# Patient Record
Sex: MALE | Race: WHITE | Employment: FULL TIME | URBAN - METROPOLITAN AREA
[De-identification: names, ages, dates, MRNs, and addresses within clinical notes are randomized per-mention and may not be internally consistent; named-entity substitution may affect disease eponyms.]

---

## 2019-04-22 ENCOUNTER — APPOINTMENT (OUTPATIENT)
Dept: GENERAL RADIOLOGY | Age: 44
DRG: 247 | End: 2019-04-22
Attending: EMERGENCY MEDICINE
Payer: COMMERCIAL

## 2019-04-22 ENCOUNTER — HOSPITAL ENCOUNTER (INPATIENT)
Age: 44
LOS: 3 days | Discharge: HOME | DRG: 247 | End: 2019-04-25
Attending: EMERGENCY MEDICINE | Admitting: INTERNAL MEDICINE
Payer: COMMERCIAL

## 2019-04-22 DIAGNOSIS — I50.9 CONGESTIVE HEART FAILURE, UNSPECIFIED HF CHRONICITY, UNSPECIFIED HEART FAILURE TYPE (HCC): ICD-10-CM

## 2019-04-22 DIAGNOSIS — R07.9 EXERTIONAL CHEST PAIN: Primary | ICD-10-CM

## 2019-04-22 DIAGNOSIS — R77.8 ELEVATED TROPONIN: ICD-10-CM

## 2019-04-22 PROBLEM — I21.4 NSTEMI (NON-ST ELEVATED MYOCARDIAL INFARCTION) (HCC): Status: ACTIVE | Noted: 2019-04-22

## 2019-04-22 LAB
ALBUMIN SERPL-MCNC: 3.8 G/DL (ref 3.5–5)
ALBUMIN/GLOB SERPL: 1 {RATIO} (ref 1.1–2.2)
ALP SERPL-CCNC: 92 U/L (ref 45–117)
ALT SERPL-CCNC: 39 U/L (ref 12–78)
ANION GAP SERPL CALC-SCNC: 7 MMOL/L (ref 5–15)
AST SERPL-CCNC: 30 U/L (ref 15–37)
ATRIAL RATE: 58 BPM
BASOPHILS # BLD: 0 K/UL (ref 0–0.1)
BASOPHILS NFR BLD: 1 % (ref 0–1)
BILIRUB SERPL-MCNC: 0.6 MG/DL (ref 0.2–1)
BUN SERPL-MCNC: 13 MG/DL (ref 6–20)
BUN/CREAT SERPL: 14 (ref 12–20)
CALCIUM SERPL-MCNC: 9.2 MG/DL (ref 8.5–10.1)
CALCULATED P AXIS, ECG09: 6 DEGREES
CALCULATED R AXIS, ECG10: 29 DEGREES
CALCULATED T AXIS, ECG11: -18 DEGREES
CHLORIDE SERPL-SCNC: 106 MMOL/L (ref 97–108)
CK SERPL-CCNC: 139 U/L (ref 39–308)
CO2 SERPL-SCNC: 26 MMOL/L (ref 21–32)
COMMENT, HOLDF: NORMAL
CREAT SERPL-MCNC: 0.94 MG/DL (ref 0.7–1.3)
DIAGNOSIS, 93000: NORMAL
DIFFERENTIAL METHOD BLD: NORMAL
EOSINOPHIL # BLD: 0.1 K/UL (ref 0–0.4)
EOSINOPHIL NFR BLD: 2 % (ref 0–7)
ERYTHROCYTE [DISTWIDTH] IN BLOOD BY AUTOMATED COUNT: 12 % (ref 11.5–14.5)
GLOBULIN SER CALC-MCNC: 3.9 G/DL (ref 2–4)
GLUCOSE SERPL-MCNC: 106 MG/DL (ref 65–100)
HCT VFR BLD AUTO: 43.3 % (ref 36.6–50.3)
HGB BLD-MCNC: 14.6 G/DL (ref 12.1–17)
IMM GRANULOCYTES # BLD AUTO: 0 K/UL (ref 0–0.04)
IMM GRANULOCYTES NFR BLD AUTO: 0 % (ref 0–0.5)
LYMPHOCYTES # BLD: 1.7 K/UL (ref 0.8–3.5)
LYMPHOCYTES NFR BLD: 27 % (ref 12–49)
MCH RBC QN AUTO: 30.9 PG (ref 26–34)
MCHC RBC AUTO-ENTMCNC: 33.7 G/DL (ref 30–36.5)
MCV RBC AUTO: 91.5 FL (ref 80–99)
MONOCYTES # BLD: 0.5 K/UL (ref 0–1)
MONOCYTES NFR BLD: 8 % (ref 5–13)
NEUTS SEG # BLD: 3.9 K/UL (ref 1.8–8)
NEUTS SEG NFR BLD: 62 % (ref 32–75)
NRBC # BLD: 0 K/UL (ref 0–0.01)
NRBC BLD-RTO: 0 PER 100 WBC
P-R INTERVAL, ECG05: 142 MS
PLATELET # BLD AUTO: 172 K/UL (ref 150–400)
PMV BLD AUTO: 10.6 FL (ref 8.9–12.9)
POTASSIUM SERPL-SCNC: 3.7 MMOL/L (ref 3.5–5.1)
PROT SERPL-MCNC: 7.7 G/DL (ref 6.4–8.2)
Q-T INTERVAL, ECG07: 442 MS
QRS DURATION, ECG06: 82 MS
QTC CALCULATION (BEZET), ECG08: 433 MS
RBC # BLD AUTO: 4.73 M/UL (ref 4.1–5.7)
SAMPLES BEING HELD,HOLD: NORMAL
SODIUM SERPL-SCNC: 139 MMOL/L (ref 136–145)
TROPONIN I SERPL-MCNC: 1.2 NG/ML
TROPONIN I SERPL-MCNC: 1.47 NG/ML
TROPONIN I SERPL-MCNC: 1.65 NG/ML
VENTRICULAR RATE, ECG03: 58 BPM
WBC # BLD AUTO: 6.2 K/UL (ref 4.1–11.1)

## 2019-04-22 PROCEDURE — 74011250636 HC RX REV CODE- 250/636: Performed by: NURSE PRACTITIONER

## 2019-04-22 PROCEDURE — 36415 COLL VENOUS BLD VENIPUNCTURE: CPT

## 2019-04-22 PROCEDURE — 82550 ASSAY OF CK (CPK): CPT

## 2019-04-22 PROCEDURE — 74011250637 HC RX REV CODE- 250/637: Performed by: NURSE PRACTITIONER

## 2019-04-22 PROCEDURE — 80053 COMPREHEN METABOLIC PANEL: CPT

## 2019-04-22 PROCEDURE — 84484 ASSAY OF TROPONIN QUANT: CPT

## 2019-04-22 PROCEDURE — 85025 COMPLETE CBC W/AUTO DIFF WBC: CPT

## 2019-04-22 PROCEDURE — 74011250637 HC RX REV CODE- 250/637: Performed by: EMERGENCY MEDICINE

## 2019-04-22 PROCEDURE — 99285 EMERGENCY DEPT VISIT HI MDM: CPT

## 2019-04-22 PROCEDURE — 65660000000 HC RM CCU STEPDOWN

## 2019-04-22 PROCEDURE — 93005 ELECTROCARDIOGRAM TRACING: CPT

## 2019-04-22 PROCEDURE — 71046 X-RAY EXAM CHEST 2 VIEWS: CPT

## 2019-04-22 RX ORDER — GUAIFENESIN 100 MG/5ML
162 LIQUID (ML) ORAL
Status: DISCONTINUED | OUTPATIENT
Start: 2019-04-22 | End: 2019-04-22

## 2019-04-22 RX ORDER — MELATONIN
1000 DAILY
COMMUNITY

## 2019-04-22 RX ORDER — AMLODIPINE AND BENAZEPRIL HYDROCHLORIDE 5; 10 MG/1; MG/1
1 CAPSULE ORAL DAILY
COMMUNITY
End: 2020-03-19 | Stop reason: SDUPTHER

## 2019-04-22 RX ORDER — ATORVASTATIN CALCIUM 20 MG/1
20 TABLET, FILM COATED ORAL
Status: DISCONTINUED | OUTPATIENT
Start: 2019-04-22 | End: 2019-04-23

## 2019-04-22 RX ORDER — AMLODIPINE BESYLATE 5 MG/1
5 TABLET ORAL DAILY
Status: DISCONTINUED | OUTPATIENT
Start: 2019-04-23 | End: 2019-04-25 | Stop reason: HOSPADM

## 2019-04-22 RX ORDER — GUAIFENESIN 100 MG/5ML
81 LIQUID (ML) ORAL DAILY
Status: DISCONTINUED | OUTPATIENT
Start: 2019-04-23 | End: 2019-04-25 | Stop reason: HOSPADM

## 2019-04-22 RX ORDER — LISINOPRIL 10 MG/1
10 TABLET ORAL DAILY
Status: DISCONTINUED | OUTPATIENT
Start: 2019-04-23 | End: 2019-04-25 | Stop reason: HOSPADM

## 2019-04-22 RX ORDER — ENOXAPARIN SODIUM 100 MG/ML
1 INJECTION SUBCUTANEOUS
Status: COMPLETED | OUTPATIENT
Start: 2019-04-22 | End: 2019-04-22

## 2019-04-22 RX ORDER — CARVEDILOL 3.12 MG/1
3.12 TABLET ORAL 2 TIMES DAILY
Status: DISCONTINUED | OUTPATIENT
Start: 2019-04-22 | End: 2019-04-24

## 2019-04-22 RX ADMIN — ENOXAPARIN SODIUM 80 MG: 80 INJECTION SUBCUTANEOUS at 16:38

## 2019-04-22 RX ADMIN — NITROGLYCERIN 1 INCH: 20 OINTMENT TOPICAL at 15:43

## 2019-04-22 RX ADMIN — CARVEDILOL 3.12 MG: 3.12 TABLET, FILM COATED ORAL at 18:36

## 2019-04-22 RX ADMIN — ATORVASTATIN CALCIUM 20 MG: 20 TABLET, FILM COATED ORAL at 22:17

## 2019-04-22 NOTE — Clinical Note
Lesion located in the Proximal RCA. Balloon inserted. Balloon inflated using multiple inflations inflation technique. Pressure = 18 norma; Duration = 10 sec. Inflation 2: Pressure: 18 norma; Duration: 8 sec.

## 2019-04-22 NOTE — ED PROVIDER NOTES
The history is provided by the patient. Chest Pain This is a new problem. The current episode started more than 2 days ago. The problem has not changed (exertional episodes;) since onset. The pain is associated with exertion. Pain location: upper chest. The pain is moderate. The quality of the pain is described as pressure-like and burning. Radiates to: vague pain upper thoracic back. The symptoms are aggravated by exertion. He has tried nothing for the symptoms. Risk factors include family history and hypertension. His past medical history is significant for HTN. Patient took 4 aspirin at Urgent Care today Past Medical History:  
Diagnosis Date  
 HTN (hypertension) History reviewed. No pertinent surgical history. No family history on file. Social History Socioeconomic History  Marital status:  Spouse name: Not on file  Number of children: Not on file  Years of education: Not on file  Highest education level: Not on file Occupational History  Not on file Social Needs  Financial resource strain: Not on file  Food insecurity:  
  Worry: Not on file Inability: Not on file  Transportation needs:  
  Medical: Not on file Non-medical: Not on file Tobacco Use  Smoking status: Never Smoker Substance and Sexual Activity  Alcohol use: Not on file  Drug use: Not on file  Sexual activity: Not on file Lifestyle  Physical activity:  
  Days per week: Not on file Minutes per session: Not on file  Stress: Not on file Relationships  Social connections:  
  Talks on phone: Not on file Gets together: Not on file Attends [employer] service: Not on file Active member of club or organization: Not on file Attends meetings of clubs or organizations: Not on file Relationship status: Not on file  Intimate partner violence:  
  Fear of current or ex partner: Not on file Emotionally abused: Not on file Physically abused: Not on file Forced sexual activity: Not on file Other Topics Concern  Not on file Social History Narrative  Not on file ALLERGIES: Patient has no known allergies. Review of Systems Cardiovascular: Positive for chest pain. All other systems reviewed and are negative. Vitals:  
 04/22/19 1251 BP: (!) 160/98 Pulse: 67 Resp: 18 Temp: 98.7 °F (37.1 °C) SpO2: 98% Weight: 84.8 kg (187 lb) Height: 6' (1.829 m) Physical Exam  
Constitutional: He appears well-developed and well-nourished. HENT:  
Head: Normocephalic and atraumatic. Mouth/Throat: Oropharynx is clear and moist.  
Eyes: Pupils are equal, round, and reactive to light. EOM are normal.  
Neck: Normal range of motion. Neck supple. Cardiovascular: Normal rate, regular rhythm, normal heart sounds and intact distal pulses. Exam reveals no gallop and no friction rub. No murmur heard. Pulmonary/Chest: Effort normal. No respiratory distress. He has no wheezes. He has no rales. Abdominal: Soft. There is no tenderness. There is no rebound. Musculoskeletal: Normal range of motion. He exhibits no tenderness. Neurological: He is alert. No cranial nerve deficit. Motor; symmetric Skin: No erythema. Psychiatric: He has a normal mood and affect. His behavior is normal.  
Nursing note and vitals reviewed. MDM Procedures ED EKG interpretation: 
Rhythm: sinus bradycardia; and regular . Rate (approx.): 58; Axis: normal; P wave: normal; QRS interval: normal ; ST/T wave: normal; in  Lead: ; Other findings: . This EKG was interpreted by Conner Velarde MD,ED Provider.  1:26 PM

## 2019-04-22 NOTE — ED TRIAGE NOTES
Triage:  Pt to ED via referral of [facility] due to concerns over reoccuring chest pain, fatigue, and abnormal labs (elevated troponin 0.66 at [facility]). Pt states over the last several days has noted midsternal chest discomfort with activity and yesterday began to feel more fatigued than normal .

## 2019-04-22 NOTE — ROUTINE PROCESS
TRANSFER - OUT REPORT: 
 
Verbal report given to Sharmon Nyhan, RN(name) on Maximino Wang  being transferred to CVSU(unit) for routine progression of care Report consisted of patients Situation, Background, Assessment and  
Recommendations(SBAR). Information from the following report(s) SBAR, ED Summary, MAR, Recent Results and Cardiac Rhythm NSR was reviewed with the receiving nurse. Lines:  
Peripheral IV 04/22/19 Left Antecubital (Active) Site Assessment Clean, dry, & intact 4/22/2019  1:23 PM  
Phlebitis Assessment 0 4/22/2019  1:23 PM  
Infiltration Assessment 0 4/22/2019  1:23 PM  
Dressing Status Clean, dry, & intact 4/22/2019  1:23 PM  
Dressing Type Transparent 4/22/2019  1:23 PM  
Hub Color/Line Status Pink 4/22/2019  1:23 PM  
Alcohol Cap Used No 4/22/2019  1:23 PM  
  
 
Opportunity for questions and clarification was provided. Patient transported with: 
 Monitor Medic

## 2019-04-22 NOTE — Clinical Note
Lesion located in the Proximal RCA. Balloon inserted. Balloon inflated using multiple inflations inflation technique. Pressure = 12 norma; Duration = 14 sec. Inflation 2: Pressure: 16 norma; Duration: 6 sec.

## 2019-04-22 NOTE — Clinical Note
Lesion located in the Proximal RCA. Balloon inserted. Balloon inflated using multiple inflations inflation technique. Pressure = 12 norma; Duration = 15 sec. Inflation 2: Pressure: 14 norma; Duration: 14 sec.

## 2019-04-22 NOTE — Clinical Note
Lesion located in the Proximal RCA. Balloon inserted. Balloon inflated using multiple inflations inflation technique. Pressure = 12 norma; Duration = 10 sec. Inflation 2: Pressure: 16 norma; Duration: 7 sec. Inflation 3: Pressure: 18 norma; Duration: 8 sec.

## 2019-04-22 NOTE — H&P
Cardiology H&P Note Patient Name: Barry Lawson  : 1975 MRN: [id number] Date: 2019  Time: 3:09 PM 
 
Admit Diagnosis: No admission diagnoses are documented for this encounter. Primary Cardiologist: Dr. Annie Christina MD   Consulting Cardiologist: Elise Sood. James Reyes M.D.  
 
Reason for Consult: chest pain, elevated troponin Requesting MD: Dr. Saul Virk MD 
 
HPI: 
Barry Lawson is a 44 y.o. male presented on 2019  With chief c/o of chest pain with exertion. Has PMH of HTN. Reports episodes of chest pain when running Friday and yesterday. States he developed burning sensation across upper chest which occurred when he was running quickly. Reported that it worsened with deep breath. Also noticed similar pain across upper back. Did not notice SOB or increased diaphoresis. No N or V. He continued to run but then continued to have pain so he slowed to a jog and pain went away. Pt runs 3-7 miles or exercises 3-4 times per week and has never had this pain before. Additionally reports that he noticed new fatigue Friday, thought he was getting flu or sick although no fever or myalgias. Felt better on Saturday but did not exercise. Felt fatigued again yesterday. No fevers or chills. Today he has had no chest pain but has not exercised. He's planning to run an event this weekend so he went to [facility] to be checked out before the race. [facility] denoted an elevated troponin (0.66) and pt was given 4 baby asa and referred to [facility] [place] [facility]. Pt Denies any hx of HLD (said he had recent blood work with high HDL), DM. Saw Dr. Ryne Mcfadden in  for bradycardia evaluation. Stress echo at that time was normal.  Bradycardia attributed to well conditioned heart. SH: never smoker, daily ETOH use (1 glass of wine or beer). FH: Father had MI in his 29's- was a heavy smoker at that time, quit after MI and has not had another event. Subjective:  Currently denies chest pain, SOB, dizziness, back pain. Assessment and Plan 1. NSTEMI:   
-Troponin trending up -.66-->>1.65. Continue to trend 
-No chest pain currently. Chest pain was with exertion only. -ASA 81 mg daily (received 4 baby ASA today) -Lovenox 1mg/kg x 1 now 
-Start statin.   
-Start low dose BB if HR will tolerate. -Given chest pain with exertion, rising troponin and family history, will proceed with [facility] tomorrow at 1Pm with Dr. Duke Javier MD 
 
2. Hx of HTN: 
-BP elevated on presentation, better now.  
-Continue home amlodipine-benazapril 3. Family history early CAD Saw and evaluated pt and agree with above assessment and plan. Pt with NSTEMI. Stabilize with cardiac meds and anticoagulation (lovenox for ACS x1) as pt is chest pain free now. Plan for cardiac cath tomorrow with Dr. Soco Fleming. Dr. Fred Matthews to follow. Miguel Newell MD 
 
 
Review of Symptoms: 
Constitutional: Negative for fever, chills, weight loss, +fatigue. HEENT: Negative for dysphagia. Respiratory: Negative for cough,  
Cardiovascular: Positive for chest pain with exertion, negative palpitations, orthopnea, leg swelling, syncope, and PND. Gastrointestinal: Negative for nausea, vomiting, diarrhea, blood in stool and melena, abdominal pain Genitourinary: Negative for hematuria Musculoskeletal: Negative for myalgias Skin: Negative for rash. Heme: Does not bleed or bruise easily. Neurological: Negative for speech changes and focal weakness Previous treatment/evaluation includes stress echo . Cardiac risk factors: family history, male gender, hypertension. Past Medical History:  
Diagnosis Date  
 HTN (hypertension) History reviewed. No pertinent surgical history. Current Facility-Administered Medications Medication Dose Route Frequency  nitroglycerin (NITROBID) 2 % ointment 1 Inch  1 Inch Topical NOW No current outpatient medications on file. No Known Allergies No family history on file. Social History Socioeconomic History  Marital status:  Spouse name: Not on file  Number of children: Not on file  Years of education: Not on file  Highest education level: Not on file Tobacco Use  Smoking status: Never Smoker Objective: 
  Physical Exam 
 
Vitals:  
Vitals:  
 04/22/19 1251 BP: (!) 160/98 Pulse: 67 Resp: 18 Temp: 98.7 °F (37.1 °C) SpO2: 98% Weight: 187 lb (84.8 kg) Height: 6' (1.829 m) General:    Alert, cooperative, no distress, appears stated age. Neck:   Supple,   
Back:     Symmetric,  
Lungs:     Clear to auscultation bilaterally. Heart[de-identified]    Regular rate and rhythm, S1, S2 normal, no murmur, click, rub or gallop. Abdomen:     Soft, non-tender. Bowel sounds normal. No masses,  No   
  organomegaly. Extremities:   Extremities normal, atraumatic, no cyanosis or edema. Vascular:   Pulses - 2+ Skin:   Skin color normal. No rashes or lesions Neurologic:   CN II-XII grossly intact. Normal strength throughout. 12 lead EKG: NSR with t wave inversion lead II, avF (was noted in lead II on prior echo 2011) Data Review:  
 
Radiology: CXR: negative Recent Labs  
  04/22/19 
1319 TROIQ 1.65* Recent Labs  
  04/22/19 
1319   
K 3.7  CO2 26 BUN 13  
CREA 0.94 * CA 9.2 Recent Labs  
  04/22/19 
1319 WBC 6.2 HGB 14.6 HCT 43.3  Recent Labs  
  04/22/19 
1319 SGOT 30 AP 92 No results for input(s): CHOL, LDLC in the last 72 hours. No lab exists for component: TGL, HDLC,  HBA1C No results for input(s): CRP, TSH, TSHEXT in the last 72 hours. No lab exists for component: ESR Thank you very much for this referral. I appreciate the opportunity to participate in this patient's care. I will follow along with above stated plan. Mckinley Mosqueda. YENI Reyes [facility] [address], [address] Soumya Springer 
   [phone] Mehul Ramirez MD

## 2019-04-22 NOTE — Clinical Note
Lesion located in the Mid RCA. Balloon inserted. Balloon inflated using multiple inflations inflation technique. Pressure = 16 norma; Duration = 10 sec. Inflation 2: Pressure: 20 norma; Duration: 10 sec. Inflation 3: Pressure: 22 norma; Duration: 10 sec.

## 2019-04-23 LAB
ACT BLD: 263 SECS (ref 79–138)
ANION GAP SERPL CALC-SCNC: 5 MMOL/L (ref 5–15)
APTT PPP: 37.2 SEC (ref 22.1–32)
BASOPHILS # BLD: 0 K/UL (ref 0–0.1)
BASOPHILS NFR BLD: 0 % (ref 0–1)
BUN SERPL-MCNC: 13 MG/DL (ref 6–20)
BUN/CREAT SERPL: 14 (ref 12–20)
CALCIUM SERPL-MCNC: 8.8 MG/DL (ref 8.5–10.1)
CHLORIDE SERPL-SCNC: 108 MMOL/L (ref 97–108)
CHOLEST SERPL-MCNC: 169 MG/DL
CO2 SERPL-SCNC: 27 MMOL/L (ref 21–32)
CREAT SERPL-MCNC: 0.91 MG/DL (ref 0.7–1.3)
DIFFERENTIAL METHOD BLD: NORMAL
EOSINOPHIL # BLD: 0 K/UL (ref 0–0.4)
EOSINOPHIL NFR BLD: 0 % (ref 0–7)
ERYTHROCYTE [DISTWIDTH] IN BLOOD BY AUTOMATED COUNT: 11.9 % (ref 11.5–14.5)
GLUCOSE SERPL-MCNC: 98 MG/DL (ref 65–100)
HCT VFR BLD AUTO: 41.6 % (ref 36.6–50.3)
HDLC SERPL-MCNC: 54 MG/DL
HDLC SERPL: 3.1 {RATIO} (ref 0–5)
HGB BLD-MCNC: 13.9 G/DL (ref 12.1–17)
IMM GRANULOCYTES # BLD AUTO: 0 K/UL (ref 0–0.04)
IMM GRANULOCYTES NFR BLD AUTO: 0 % (ref 0–0.5)
LDLC SERPL CALC-MCNC: 101.4 MG/DL (ref 0–100)
LIPID PROFILE,FLP: ABNORMAL
LYMPHOCYTES # BLD: 1.8 K/UL (ref 0.8–3.5)
LYMPHOCYTES NFR BLD: 26 % (ref 12–49)
MAGNESIUM SERPL-MCNC: 2.3 MG/DL (ref 1.6–2.4)
MCH RBC QN AUTO: 30.6 PG (ref 26–34)
MCHC RBC AUTO-ENTMCNC: 33.4 G/DL (ref 30–36.5)
MCV RBC AUTO: 91.6 FL (ref 80–99)
MONOCYTES # BLD: 0.5 K/UL (ref 0–1)
MONOCYTES NFR BLD: 7 % (ref 5–13)
NEUTS SEG # BLD: 4.6 K/UL (ref 1.8–8)
NEUTS SEG NFR BLD: 67 % (ref 32–75)
NRBC # BLD: 0 K/UL (ref 0–0.01)
NRBC BLD-RTO: 0 PER 100 WBC
PLATELET # BLD AUTO: 175 K/UL (ref 150–400)
PMV BLD AUTO: 10.8 FL (ref 8.9–12.9)
POTASSIUM SERPL-SCNC: 3.9 MMOL/L (ref 3.5–5.1)
RBC # BLD AUTO: 4.54 M/UL (ref 4.1–5.7)
SODIUM SERPL-SCNC: 140 MMOL/L (ref 136–145)
THERAPEUTIC RANGE,PTTT: ABNORMAL SECS (ref 58–77)
TRIGL SERPL-MCNC: 68 MG/DL (ref ?–150)
TROPONIN I SERPL-MCNC: 1.51 NG/ML
VLDLC SERPL CALC-MCNC: 13.6 MG/DL
WBC # BLD AUTO: 6.9 K/UL (ref 4.1–11.1)

## 2019-04-23 PROCEDURE — 74011250636 HC RX REV CODE- 250/636: Performed by: INTERNAL MEDICINE

## 2019-04-23 PROCEDURE — 92928 PRQ TCAT PLMT NTRAC ST 1 LES: CPT | Performed by: STUDENT IN AN ORGANIZED HEALTH CARE EDUCATION/TRAINING PROGRAM

## 2019-04-23 PROCEDURE — 77030013744: Performed by: STUDENT IN AN ORGANIZED HEALTH CARE EDUCATION/TRAINING PROGRAM

## 2019-04-23 PROCEDURE — 83735 ASSAY OF MAGNESIUM: CPT

## 2019-04-23 PROCEDURE — 99152 MOD SED SAME PHYS/QHP 5/>YRS: CPT | Performed by: STUDENT IN AN ORGANIZED HEALTH CARE EDUCATION/TRAINING PROGRAM

## 2019-04-23 PROCEDURE — 74011250637 HC RX REV CODE- 250/637: Performed by: NURSE PRACTITIONER

## 2019-04-23 PROCEDURE — C1725 CATH, TRANSLUMIN NON-LASER: HCPCS | Performed by: STUDENT IN AN ORGANIZED HEALTH CARE EDUCATION/TRAINING PROGRAM

## 2019-04-23 PROCEDURE — 85730 THROMBOPLASTIN TIME PARTIAL: CPT

## 2019-04-23 PROCEDURE — C1894 INTRO/SHEATH, NON-LASER: HCPCS | Performed by: STUDENT IN AN ORGANIZED HEALTH CARE EDUCATION/TRAINING PROGRAM

## 2019-04-23 PROCEDURE — 4A023N7 MEASUREMENT OF CARDIAC SAMPLING AND PRESSURE, LEFT HEART, PERCUTANEOUS APPROACH: ICD-10-PCS | Performed by: STUDENT IN AN ORGANIZED HEALTH CARE EDUCATION/TRAINING PROGRAM

## 2019-04-23 PROCEDURE — C1769 GUIDE WIRE: HCPCS | Performed by: STUDENT IN AN ORGANIZED HEALTH CARE EDUCATION/TRAINING PROGRAM

## 2019-04-23 PROCEDURE — B2111ZZ FLUOROSCOPY OF MULTIPLE CORONARY ARTERIES USING LOW OSMOLAR CONTRAST: ICD-10-PCS | Performed by: STUDENT IN AN ORGANIZED HEALTH CARE EDUCATION/TRAINING PROGRAM

## 2019-04-23 PROCEDURE — 93458 L HRT ARTERY/VENTRICLE ANGIO: CPT | Performed by: STUDENT IN AN ORGANIZED HEALTH CARE EDUCATION/TRAINING PROGRAM

## 2019-04-23 PROCEDURE — C1753 CATH, INTRAVAS ULTRASOUND: HCPCS | Performed by: STUDENT IN AN ORGANIZED HEALTH CARE EDUCATION/TRAINING PROGRAM

## 2019-04-23 PROCEDURE — 85347 COAGULATION TIME ACTIVATED: CPT

## 2019-04-23 PROCEDURE — 77030004532 HC CATH ANGI DX IMP BSC -A: Performed by: STUDENT IN AN ORGANIZED HEALTH CARE EDUCATION/TRAINING PROGRAM

## 2019-04-23 PROCEDURE — 65660000000 HC RM CCU STEPDOWN

## 2019-04-23 PROCEDURE — 99153 MOD SED SAME PHYS/QHP EA: CPT | Performed by: STUDENT IN AN ORGANIZED HEALTH CARE EDUCATION/TRAINING PROGRAM

## 2019-04-23 PROCEDURE — 027034Z DILATION OF CORONARY ARTERY, ONE ARTERY WITH DRUG-ELUTING INTRALUMINAL DEVICE, PERCUTANEOUS APPROACH: ICD-10-PCS | Performed by: STUDENT IN AN ORGANIZED HEALTH CARE EDUCATION/TRAINING PROGRAM

## 2019-04-23 PROCEDURE — 80061 LIPID PANEL: CPT

## 2019-04-23 PROCEDURE — 77030019569 HC BND COMPR RAD TERU -B: Performed by: STUDENT IN AN ORGANIZED HEALTH CARE EDUCATION/TRAINING PROGRAM

## 2019-04-23 PROCEDURE — 92978 ENDOLUMINL IVUS OCT C 1ST: CPT | Performed by: STUDENT IN AN ORGANIZED HEALTH CARE EDUCATION/TRAINING PROGRAM

## 2019-04-23 PROCEDURE — 74011636320 HC RX REV CODE- 636/320: Performed by: STUDENT IN AN ORGANIZED HEALTH CARE EDUCATION/TRAINING PROGRAM

## 2019-04-23 PROCEDURE — 77030012468 HC VLV BLEEDBK CNTRL ABBT -B: Performed by: STUDENT IN AN ORGANIZED HEALTH CARE EDUCATION/TRAINING PROGRAM

## 2019-04-23 PROCEDURE — C1874 STENT, COATED/COV W/DEL SYS: HCPCS | Performed by: STUDENT IN AN ORGANIZED HEALTH CARE EDUCATION/TRAINING PROGRAM

## 2019-04-23 PROCEDURE — B2151ZZ FLUOROSCOPY OF LEFT HEART USING LOW OSMOLAR CONTRAST: ICD-10-PCS | Performed by: STUDENT IN AN ORGANIZED HEALTH CARE EDUCATION/TRAINING PROGRAM

## 2019-04-23 PROCEDURE — 74011250637 HC RX REV CODE- 250/637: Performed by: SPECIALIST

## 2019-04-23 PROCEDURE — 80048 BASIC METABOLIC PNL TOTAL CA: CPT

## 2019-04-23 PROCEDURE — 77030010221 HC SPLNT WR POS TELE -B: Performed by: STUDENT IN AN ORGANIZED HEALTH CARE EDUCATION/TRAINING PROGRAM

## 2019-04-23 PROCEDURE — C1887 CATHETER, GUIDING: HCPCS | Performed by: STUDENT IN AN ORGANIZED HEALTH CARE EDUCATION/TRAINING PROGRAM

## 2019-04-23 PROCEDURE — 36415 COLL VENOUS BLD VENIPUNCTURE: CPT

## 2019-04-23 PROCEDURE — 93005 ELECTROCARDIOGRAM TRACING: CPT

## 2019-04-23 PROCEDURE — 84484 ASSAY OF TROPONIN QUANT: CPT

## 2019-04-23 PROCEDURE — 74011000250 HC RX REV CODE- 250: Performed by: STUDENT IN AN ORGANIZED HEALTH CARE EDUCATION/TRAINING PROGRAM

## 2019-04-23 PROCEDURE — 74011250637 HC RX REV CODE- 250/637: Performed by: STUDENT IN AN ORGANIZED HEALTH CARE EDUCATION/TRAINING PROGRAM

## 2019-04-23 PROCEDURE — 74011250636 HC RX REV CODE- 250/636: Performed by: STUDENT IN AN ORGANIZED HEALTH CARE EDUCATION/TRAINING PROGRAM

## 2019-04-23 PROCEDURE — 74011250636 HC RX REV CODE- 250/636

## 2019-04-23 PROCEDURE — 85025 COMPLETE CBC W/AUTO DIFF WBC: CPT

## 2019-04-23 DEVICE — XIENCE SIERRA™ EVEROLIMUS ELUTING CORONARY STENT SYSTEM 3.50 MM X 28 MM / RAPID-EXCHANGE
Type: IMPLANTABLE DEVICE | Status: FUNCTIONAL
Brand: XIENCE SIERRA™

## 2019-04-23 RX ORDER — VERAPAMIL HYDROCHLORIDE 2.5 MG/ML
INJECTION, SOLUTION INTRAVENOUS AS NEEDED
Status: DISCONTINUED | OUTPATIENT
Start: 2019-04-23 | End: 2019-04-23 | Stop reason: HOSPADM

## 2019-04-23 RX ORDER — HEPARIN SODIUM 10000 [USP'U]/100ML
11-25 INJECTION, SOLUTION INTRAVENOUS
Status: DISCONTINUED | OUTPATIENT
Start: 2019-04-23 | End: 2019-04-25

## 2019-04-23 RX ORDER — HEPARIN SODIUM 1000 [USP'U]/ML
INJECTION, SOLUTION INTRAVENOUS; SUBCUTANEOUS AS NEEDED
Status: DISCONTINUED | OUTPATIENT
Start: 2019-04-23 | End: 2019-04-23 | Stop reason: HOSPADM

## 2019-04-23 RX ORDER — FENTANYL CITRATE 50 UG/ML
INJECTION, SOLUTION INTRAMUSCULAR; INTRAVENOUS AS NEEDED
Status: DISCONTINUED | OUTPATIENT
Start: 2019-04-23 | End: 2019-04-23 | Stop reason: HOSPADM

## 2019-04-23 RX ORDER — MIDAZOLAM HYDROCHLORIDE 1 MG/ML
INJECTION, SOLUTION INTRAMUSCULAR; INTRAVENOUS AS NEEDED
Status: DISCONTINUED | OUTPATIENT
Start: 2019-04-23 | End: 2019-04-23 | Stop reason: HOSPADM

## 2019-04-23 RX ORDER — HEPARIN SODIUM 200 [USP'U]/100ML
INJECTION, SOLUTION INTRAVENOUS
Status: COMPLETED | OUTPATIENT
Start: 2019-04-23 | End: 2019-04-23

## 2019-04-23 RX ORDER — SODIUM CHLORIDE 9 MG/ML
1.5 INJECTION, SOLUTION INTRAVENOUS CONTINUOUS
Status: DISPENSED | OUTPATIENT
Start: 2019-04-23 | End: 2019-04-23

## 2019-04-23 RX ORDER — SODIUM CHLORIDE 0.9 % (FLUSH) 0.9 %
5-40 SYRINGE (ML) INJECTION EVERY 8 HOURS
Status: DISCONTINUED | OUTPATIENT
Start: 2019-04-23 | End: 2019-04-25 | Stop reason: HOSPADM

## 2019-04-23 RX ORDER — SODIUM CHLORIDE 9 MG/ML
3 INJECTION, SOLUTION INTRAVENOUS CONTINUOUS
Status: DISCONTINUED | OUTPATIENT
Start: 2019-04-23 | End: 2019-04-23 | Stop reason: HOSPADM

## 2019-04-23 RX ORDER — SODIUM CHLORIDE 9 MG/ML
1.5 INJECTION, SOLUTION INTRAVENOUS CONTINUOUS
Status: DISCONTINUED | OUTPATIENT
Start: 2019-04-23 | End: 2019-04-23 | Stop reason: HOSPADM

## 2019-04-23 RX ORDER — SODIUM CHLORIDE 9 MG/ML
3 INJECTION, SOLUTION INTRAVENOUS CONTINUOUS
Status: DISPENSED | OUTPATIENT
Start: 2019-04-23 | End: 2019-04-23

## 2019-04-23 RX ORDER — SODIUM CHLORIDE 0.9 % (FLUSH) 0.9 %
5-40 SYRINGE (ML) INJECTION AS NEEDED
Status: DISCONTINUED | OUTPATIENT
Start: 2019-04-23 | End: 2019-04-25 | Stop reason: HOSPADM

## 2019-04-23 RX ORDER — ATORVASTATIN CALCIUM 40 MG/1
40 TABLET, FILM COATED ORAL
Status: DISCONTINUED | OUTPATIENT
Start: 2019-04-23 | End: 2019-04-25 | Stop reason: HOSPADM

## 2019-04-23 RX ADMIN — Medication 10 ML: at 23:13

## 2019-04-23 RX ADMIN — SODIUM CHLORIDE 1.5 ML/KG/HR: 900 INJECTION, SOLUTION INTRAVENOUS at 13:42

## 2019-04-23 RX ADMIN — ASPIRIN 81 MG 81 MG: 81 TABLET ORAL at 08:28

## 2019-04-23 RX ADMIN — CARVEDILOL 3.12 MG: 3.12 TABLET, FILM COATED ORAL at 18:30

## 2019-04-23 RX ADMIN — LISINOPRIL 10 MG: 10 TABLET ORAL at 08:28

## 2019-04-23 RX ADMIN — ATORVASTATIN CALCIUM 40 MG: 40 TABLET, FILM COATED ORAL at 23:13

## 2019-04-23 RX ADMIN — SODIUM CHLORIDE 3 ML/KG/HR: 900 INJECTION, SOLUTION INTRAVENOUS at 12:16

## 2019-04-23 RX ADMIN — CARVEDILOL 3.12 MG: 3.12 TABLET, FILM COATED ORAL at 08:28

## 2019-04-23 RX ADMIN — HEPARIN SODIUM AND DEXTROSE 11 UNITS/KG/HR: 10000; 5 INJECTION INTRAVENOUS at 20:50

## 2019-04-23 RX ADMIN — SODIUM CHLORIDE 3 ML/KG/HR: 900 INJECTION, SOLUTION INTRAVENOUS at 12:42

## 2019-04-23 RX ADMIN — AMLODIPINE BESYLATE 5 MG: 5 TABLET ORAL at 08:28

## 2019-04-23 RX ADMIN — SODIUM CHLORIDE 1.5 ML/KG/HR: 900 INJECTION, SOLUTION INTRAVENOUS at 13:00

## 2019-04-23 NOTE — CARDIO/PULMONARY
Cardiac Rehab: MI education folder, with catheterization brochure, to bedside of [address] Met with the pt., and his wife to provide education. Educated using teach back method. Reviewed MI diagnosis definition and purpose of intervention. Discussed risk factors for CAD to include the following: family history, elevated BMI, hyperlipidemia, hypertension, diabetes, stress, and smoking. Discussed Heart Healthy/Low Sodium (2000 mg) diet. Reviewed the importance of medication compliance, the purpose of lipitor, coreg, and potential side effects. Discussed follow up appointments with cardiologist, signs and symptoms of angina, and what to report to physician after discharge. Emphasized the value of cardiac rehab. Discussed Cardiac Rehab Program format, benefits, and encouraged enrollment to assist with risk modification and management. [address] Is an avid runner and will need to check work schedule but may look at dietary consult. Will follow for cath results. Discussed the purpose of the cardiac cath and possible treatments/interventions. Norris Brink  And his wife verbalized understanding with questions answered.   Tommi Schilder, RN

## 2019-04-23 NOTE — PROGRESS NOTES
15:45 TRANSFER - IN REPORT: 
 
Verbal report received from [facility]) on Karla Pan  being received from cath lab(unit) for routine progression of care Report consisted of patients Situation, Background, Assessment and  
Recommendations(SBAR). Information from the following report(s) SBAR was reviewed with the receiving nurse. Opportunity for questions and clarification was provided. Assessment completed upon patients arrival to unit and care assumed. 17:40 Small amount of oozing was noted at R. radal cath site. TR band still in place. Total of 9 mL had been removed prior to oozing (3 ml every 15 minutes per policy). 17:48 No apparent oozing at this time. No additional air removed from TR band. Will continue to monitor and release air as appropriate. 18:30 Added 3 mL air to TR band to increase pressure to R radial site for oozing. Pt educated on importance of immobilizing wrist at this time. He stated he had inadvertently moved his wrist when up to bathroom. 19:17 No additional oozing noted from R radial cath site; released 1 mL air from TR band. 19:30 Bedside shift change report given to Petra (oncoming nurse) by Fly Watters (offgoing nurse). Report included the following information SBAR. Slowly removing air from TR band, then heparin drip to start 2 hrs after TR band is removed and hemostasis of R radial site is established.

## 2019-04-23 NOTE — PROGRESS NOTES
12:23 Pt taken to cath lab by two cath lab RNs. JEANCARLOS fluids taken by cath lab RN to attach to their own pump.

## 2019-04-23 NOTE — PROCEDURES
BRIEF PROCEDURE NOTE    Date of Procedure: 4/23/2019   Preoperative Diagnosis: nstemi  Postoperative Diagnosis: nstemi    Procedure: Left heart cath, coronary angiography, PCI, IVUS  Interventional Cardiologist: Eleni Mejia DO  Anesthesia: local + IV moderate sedation   Estimated Blood Loss: Minimal    Access: right radial artery, 6F  Catheters:  Left coronary: JL 3.5  Right coronary: JR4    Findings:   L Main: large, no significant disease  LAD: large caliber, mild disease  LCx: large, supplies L-PL branches, OM 2 with ostial disease of 70% with immediate bifurcation into two branches, superior branch with 70% disease and smaller inferior 80% diseae  RCA: large caliber vessel, proximal  with hazy filling defect, distal vessel fills via collaterals    LVEDP:  10-12 mmhg    PCI:   AL 0.75 guide  Heparin for AC  runthrough wire passed easily into distal vessel    2.5x12 compliant balloon  Post-inflation evidence of thrombus in distal aspect of RV marginal    3.5x28mm Xience Yuridia GALINA, deployed at [facility]. Distally post-dilated with 3.5NC balloon to high pressure  Mid and proximal with 4. 0NC balloon at high pressure  Very proximal aspect of stent 4.5NC balloon at high pressure    IVUS guided        Specimens Removed : None    Closure Device: radial TR band    See full cath note. Complications: none    Findings:  1. 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 Normal LVEDP  3.   Successful PCI of proximal RCA    Plan:    Recommend 36-48hrs of heparin due to thrombus burden in distal vessel  GDMT  DAPT for at least one year    OM2 would be difficult to treat due to ostial OM disease with immediate bifurcation with disease involving both branches of [facility], DO        [facility], DO  [facility] [address], [address] [address]                                       Office [phone],Fax [phone]

## 2019-04-23 NOTE — PROGRESS NOTES
Verbal report given to Isadora(name) on NAME  being transferred to CVSU(unit) for routine progression of care Report consisted of patients Situation, Background, Assessment and  
Recommendations(SBAR). Information from the following report(s) Procedure Summary, MAR and Recent Results was reviewed with the receiving nurse. Lines:    
 
Opportunity for questions and clarification was provided. Patient transported with: 
 Monitor Registered Nurse

## 2019-04-23 NOTE — PROGRESS NOTES
Reason for Admission:  Patient presented with chest pain with exertion RRAT Score:  4 Plan for utilizing home health:  Anticipate patient would benefit from [facility] for NSTEMI, will need H2H orders for NSTEMI Current Advanced Directive/Advance Care Plan: Full Code, none on file Likelihood of Readmission:  Low Transition of Care Plan:  Home with [facility] if patient agreeable The CM met with the patient and spouse at bedside in order to introduce the role of CM and assess for patient needs. The patient lives at home with his wife- verified demographics and insurance information. The patient is independent- endorses eing independent with ADLs and mobility, patient is a runner and active. The patient denied use of DME in the home, and denied difficulty affording or accessing medications prior to hospitalization. The patient's family will transport home upon discharge. The patient is anticipated to have cardiac cath today. Patient would benefit from [facility] for NSTEMI. CM will continue to follow, will need H2H orders upon discharge. ZENON Sr Care Management Interventions PCP Verified by CM: Yes(Patient verified PCP as Dr. Eric Pretty) Mode of Transport at Discharge: Other (see comment)(Family will transport home upon discharge) Transition of Care Consult (CM Consult): Discharge Planning MyChart Signup: No 
Discharge Durable Medical Equipment: No 
Health Maintenance Reviewed: Yes Physical Therapy Consult: No 
Occupational Therapy Consult: No 
Speech Therapy Consult: No 
Current Support Network: Lives with Spouse, Own Home Confirm Follow Up Transport: Family Plan discussed with Pt/Family/Caregiver: Yes [employer] Information Provided?: No 
Discharge Location Discharge Placement: Home

## 2019-04-23 NOTE — PROGRESS NOTES
TRANSFER - IN REPORT: 
 
Verbal report received from Cynthia Santos on SPX i-nexus  being received from procedural area for routine progression of care. Report consisted of patients Situation, Background, Assessment and Recommendations(SBAR). Information from the following report(s) Procedure Summary, MAR and Recent Results was reviewed with the receiving clinician. Opportunity for questions and clarification was provided. Assessment completed upon patients arrival to [address] [place] [address] and care assumed. Cardiac Cath Lab Recovery Arrival Note: 
 
[employer] i-nexus arrived to [facility] recovery area. Patient procedure= LHC. Patient on cardiac monitor, non-invasive blood pressure, SPO2 monitor. On RA . IV  of NS on pump at 126 ml/hr. Patient status doing well without problems. Patient is A&Ox 3. Patient reports no c/o's. PROCEDURE SITE CHECK: 
 
Procedure site:without any bleeding and hematoma, no pain/discomfort reported at procedure site. No change in patient status. Continue to monitor patient and status.

## 2019-04-23 NOTE — PROGRESS NOTES
Cardiac Cath Lab Recovery Arrival Note: 
 
 
Karla Pan arrived to Cardiac Cath Lab, Recovery Area. Staff introduced to patient. Patient identifiers verified with NAME and DATE OF BIRTH. Procedure verified with patient. Consent forms reviewed and signed by patient or authorized representative and verified. Allergies verified. Patient and family oriented to department. Patient and family informed of procedure and plan of care. Questions answered with review. Patient prepped for procedure, per orders from physician, prior to arrival. 
 
Patient on cardiac monitor, non-invasive blood pressure, SPO2 monitor. On RA. Patient is A&Ox 4. Patient reports no c/o's. Patient in stretcher, in low position, with side rails up, call bell within reach, patient instructed to call if assistance as needed. Patient prep in: [address], [place] [address]. Patient family has pager # n/a Family in: hospital.  
Prep by: Tory Fuentes RN

## 2019-04-23 NOTE — PROGRESS NOTES
Lilliana Mcclendon DO [facility] [address], [address] [place], [address] [facility] Office [phone] Pre- Cardiac Catheterization Procedure Note Procedure:  [facility] Preprocedure diagnosis:  nstemi History of Presenting Illness: 
 
43 yo male with of hypertension with exertional dyspnea and chest pain presented with minimally chest pain. Review of System: 
Constitutional: neg Resp: lucero Card: exertional chest pain Neuro: neg No current facility-administered medications on file prior to encounter. Current Outpatient Medications on File Prior to Encounter Medication Sig Dispense Refill  amLODIPine-benazepril (LOTREL) 5-10 mg per capsule Take 1 Cap by mouth daily.  B.infantis-B.ani-B.long-B.bifi (PROBIOTIC 4X) 10-15 mg TbEC Take 1 Tab by mouth daily.  cholecalciferol (VITAMIN D3) 1,000 unit tablet Take 1,000 Units by mouth daily. No Known Allergies Physican Exam: 
 
Blood pressure (!) 161/97, pulse 67, temperature 99 °F (37.2 °C), resp. rate 18, height 6' (1.829 m), weight 186 lb 8.2 oz (84.6 kg), SpO2 97 %. Constitutional:  well-developed and well-nourished. No distress. Pulmonary/Chest: Effort normal and breath sounds normal. No respiratory distress, wheezes or rales. Cardiovascular: Normal rate, regular rhythm, S1 S2 . Neurological:  Alert and oriented. Coordination seems grossly normal.  
Psychiatric:  Good insight into underlying medical condition. ASA: 3 Mallampati: 2 Plan: 
 
Risk and benefit of cardiac catheterization/PCI was described in detail to patient.  (risk of vascular access complication with potential surgical intervention for management, stroke, myocardial infarction, emergent open heart surgery and death) Informed consent was obtained. Patient wishes to proceed with invasive study with potential percutaneous treatment.

## 2019-04-23 NOTE — PROGRESS NOTES
1250 - Cardiac Cath Lab Procedure Area Arrival Note: 
 
Leopold Mates arrived to Cardiac Cath Lab, Procedure Area. Patient identifiers verified with NAME and DATE OF BIRTH. Procedure verified with patient. Consent forms verified. Allergies verified. Patient informed of procedure and plan of care. Questions answered with review. Patient voiced understanding of procedure and plan of care. Patient on cardiac monitor, non-invasive blood pressure, SPO2 monitor. Placed on O2 @ 2 lpm via NC.  IV of NS on pump at 253 ml/hr. Patient status doing well without problems. Patient is A&Ox 4. Patient reports no discomfort at this time. Patient medicated during procedure with orders obtained and verified by Dr. Hanna Snider. 1415 - TRANSFER - OUT REPORT: 
 
Verbal report given to Jean Carlos ZAMORA(name) on Leopold Mates  being transferred to (unit) for routine post - op Report consisted of patients Situation, Background, Assessment and  
Recommendations(SBAR). Information from the following report(s) Procedure Summary and MAR was reviewed with the receiving nurse. Lines:  
Peripheral IV 04/22/19 Left Antecubital (Active) Site Assessment Clean, dry, & intact 4/23/2019  8:27 AM  
Phlebitis Assessment 0 4/23/2019  8:27 AM  
Infiltration Assessment 0 4/23/2019  8:27 AM  
Dressing Status Clean, dry, & intact 4/23/2019  8:27 AM  
Dressing Type Tape;Transparent 4/23/2019  8:27 AM  
Hub Color/Line Status Capped;Flushed 4/23/2019  8:27 AM  
Action Taken Open ports on tubing capped 4/23/2019  8:27 AM  
Alcohol Cap Used Yes 4/23/2019  8:27 AM  
  
 
Opportunity for questions and clarification was provided. Patient transported with: 
 [facility] Refer to patients Cardiac Cath Lab PROCEDURE REPORT for vital signs, assessment, status, and response during procedure, printed at end of case. Printed report on chart or scanned into chart.

## 2019-04-24 ENCOUNTER — APPOINTMENT (OUTPATIENT)
Dept: NON INVASIVE DIAGNOSTICS | Age: 44
DRG: 247 | End: 2019-04-24
Attending: SPECIALIST
Payer: COMMERCIAL

## 2019-04-24 LAB
APTT PPP: 29.3 SEC (ref 22.1–32)
APTT PPP: 36.7 SEC (ref 22.1–32)
APTT PPP: 55.8 SEC (ref 22.1–32)
ATRIAL RATE: 61 BPM
CALCULATED P AXIS, ECG09: 46 DEGREES
CALCULATED R AXIS, ECG10: 2 DEGREES
CALCULATED T AXIS, ECG11: -18 DEGREES
DIAGNOSIS, 93000: NORMAL
ECHO TRICUSPID ANNULAR PEAK SYSTOLIC VELOCITY: 3.1 CM/S
ERYTHROCYTE [DISTWIDTH] IN BLOOD BY AUTOMATED COUNT: 12.1 % (ref 11.5–14.5)
HCT VFR BLD AUTO: 40.4 % (ref 36.6–50.3)
HGB BLD-MCNC: 13.2 G/DL (ref 12.1–17)
MCH RBC QN AUTO: 30.8 PG (ref 26–34)
MCHC RBC AUTO-ENTMCNC: 32.7 G/DL (ref 30–36.5)
MCV RBC AUTO: 94.2 FL (ref 80–99)
NRBC # BLD: 0 K/UL (ref 0–0.01)
NRBC BLD-RTO: 0 PER 100 WBC
P-R INTERVAL, ECG05: 158 MS
PLATELET # BLD AUTO: 173 K/UL (ref 150–400)
PMV BLD AUTO: 11.1 FL (ref 8.9–12.9)
Q-T INTERVAL, ECG07: 418 MS
QRS DURATION, ECG06: 84 MS
QTC CALCULATION (BEZET), ECG08: 420 MS
RBC # BLD AUTO: 4.29 M/UL (ref 4.1–5.7)
THERAPEUTIC RANGE,PTTT: ABNORMAL SECS (ref 58–77)
THERAPEUTIC RANGE,PTTT: ABNORMAL SECS (ref 58–77)
THERAPEUTIC RANGE,PTTT: NORMAL SECS (ref 58–77)
VENTRICULAR RATE, ECG03: 61 BPM
WBC # BLD AUTO: 8.2 K/UL (ref 4.1–11.1)

## 2019-04-24 PROCEDURE — 85730 THROMBOPLASTIN TIME PARTIAL: CPT

## 2019-04-24 PROCEDURE — 74011250636 HC RX REV CODE- 250/636: Performed by: SPECIALIST

## 2019-04-24 PROCEDURE — 65660000000 HC RM CCU STEPDOWN

## 2019-04-24 PROCEDURE — 74011250637 HC RX REV CODE- 250/637: Performed by: NURSE PRACTITIONER

## 2019-04-24 PROCEDURE — 74011250636 HC RX REV CODE- 250/636: Performed by: STUDENT IN AN ORGANIZED HEALTH CARE EDUCATION/TRAINING PROGRAM

## 2019-04-24 PROCEDURE — 74011250637 HC RX REV CODE- 250/637: Performed by: SPECIALIST

## 2019-04-24 PROCEDURE — 36415 COLL VENOUS BLD VENIPUNCTURE: CPT

## 2019-04-24 PROCEDURE — 85027 COMPLETE CBC AUTOMATED: CPT

## 2019-04-24 PROCEDURE — 93306 TTE W/DOPPLER COMPLETE: CPT

## 2019-04-24 PROCEDURE — 74011250636 HC RX REV CODE- 250/636: Performed by: INTERNAL MEDICINE

## 2019-04-24 PROCEDURE — 74011250637 HC RX REV CODE- 250/637: Performed by: STUDENT IN AN ORGANIZED HEALTH CARE EDUCATION/TRAINING PROGRAM

## 2019-04-24 RX ORDER — METOPROLOL SUCCINATE 25 MG/1
25 TABLET, EXTENDED RELEASE ORAL DAILY
Status: DISCONTINUED | OUTPATIENT
Start: 2019-04-24 | End: 2019-04-25 | Stop reason: HOSPADM

## 2019-04-24 RX ORDER — HEPARIN SODIUM 5000 [USP'U]/ML
4000 INJECTION, SOLUTION INTRAVENOUS; SUBCUTANEOUS ONCE
Status: COMPLETED | OUTPATIENT
Start: 2019-04-24 | End: 2019-04-24

## 2019-04-24 RX ORDER — HEPARIN SODIUM 1000 [USP'U]/ML
4000 INJECTION, SOLUTION INTRAVENOUS; SUBCUTANEOUS ONCE
Status: COMPLETED | OUTPATIENT
Start: 2019-04-24 | End: 2019-04-24

## 2019-04-24 RX ADMIN — HEPARIN SODIUM AND DEXTROSE 19 UNITS/KG/HR: 10000; 5 INJECTION INTRAVENOUS at 18:04

## 2019-04-24 RX ADMIN — METOPROLOL SUCCINATE 25 MG: 25 TABLET, EXTENDED RELEASE ORAL at 12:08

## 2019-04-24 RX ADMIN — LISINOPRIL 10 MG: 10 TABLET ORAL at 09:30

## 2019-04-24 RX ADMIN — ASPIRIN 81 MG 81 MG: 81 TABLET ORAL at 09:30

## 2019-04-24 RX ADMIN — HEPARIN SODIUM 4000 UNITS: 5000 INJECTION INTRAVENOUS; SUBCUTANEOUS at 05:19

## 2019-04-24 RX ADMIN — AMLODIPINE BESYLATE 5 MG: 5 TABLET ORAL at 09:30

## 2019-04-24 RX ADMIN — Medication 10 ML: at 05:20

## 2019-04-24 RX ADMIN — TICAGRELOR 90 MG: 90 TABLET ORAL at 18:04

## 2019-04-24 RX ADMIN — TICAGRELOR 90 MG: 90 TABLET ORAL at 07:11

## 2019-04-24 RX ADMIN — CARVEDILOL 3.12 MG: 3.12 TABLET, FILM COATED ORAL at 09:30

## 2019-04-24 RX ADMIN — HEPARIN SODIUM 4000 UNITS: 1000 INJECTION INTRAVENOUS; SUBCUTANEOUS at 14:36

## 2019-04-24 RX ADMIN — ATORVASTATIN CALCIUM 40 MG: 40 TABLET, FILM COATED ORAL at 22:11

## 2019-04-24 RX ADMIN — Medication 10 ML: at 14:37

## 2019-04-24 RX ADMIN — Medication 10 ML: at 22:11

## 2019-04-24 NOTE — PROGRESS NOTES
0730:  Bedside shift change report given to Rah Deleon (oncoming nurse) by Chilo Reardon (offgoing nurse). Report included the following information SBAR, Kardex, MAR and Recent Results. 1930:  Bedside shift change report given to [facility] (oncoming nurse) by Kelly Lovell (offgoing nurse). Report included the following information SBAR, Kardex, MAR and Recent Results. Problem: Unstable angina/NSTEMI: Day 2 Goal: Activity/Safety Outcome: Progressing Towards Goal 
  
Problem: Unstable angina/NSTEMI: Day 2 Goal: Medications Outcome: Progressing Towards Goal 
  
Problem: Unstable angina/NSTEMI: Day 2 Goal: Respiratory Outcome: Progressing Towards Goal 
  
Problem: Unstable angina/NSTEMI: Day 2 Goal: *Hemodynamically stable Outcome: Progressing Towards Goal 
  
Problem: Falls - Risk of 
Goal: *Absence of Falls Description Document Carlotta Mansfield Fall Risk and appropriate interventions in the flowsheet. Outcome: Progressing Towards Goal 
Note:  
Pt up ad hedy and remains free of falls

## 2019-04-24 NOTE — CARDIO/PULMONARY
Cardiac Rehab: MI with cath booklet education folder is at the bedside Elizabeth Costello. Educated using teach back method. Reviewed CAD diagnosis definition and purpose of intervention. Discussed risk factors for CAD to include the following: family history, elevated BMI, hyperlipidemia, hypertension, diabetes, stress, and smoking. . Discussed Heart Healthy/Low Sodium (2000 mg) diet. Reviewed the importance of medication compliance, the purpose of metoprolol, lipitor, lisinopril and brilinta and potential side effects. Discussed follow up appointments with cardiologist, signs and symptoms of angina, and what to report to physician after discharge. Emphasized the value of cardiac rehab. Discussed Cardiac Rehab Program format, benefits, and encouraged enrollment to assist with risk modification and management. Initial Cardiac Rehab Program intake appointment scheduled for 5/7/2019 and appointment information is on the AVS. Elizabeth Costello verbalized understanding with questions answered.  Carmen Virk RN

## 2019-04-24 NOTE — PROGRESS NOTES
CM participated in IDR rounds on the patient- patient started on Heparin drip, needs an additional 24 hours. Patient is independent and active prior to hospitalization- CM awaiting H2H orders for NSTEMI. ZENON Briseno 
 
16:13 p.m.- CM met with patient at bedside to offer [facility] for NSTEMI- at this time patient politely declined, stated he would notify CM if he has additional questions or feels like he needs [facility] visit.  ZENON Briseno

## 2019-04-24 NOTE — PROGRESS NOTES
2000- 3 mls removed from TR band. Cannot begin Heparin gtt until TR band removed and bleeding controlled. 2030- TR band removed without incident. Bleeding not observed since 1800 per report. 2100- Heparin gtt started. Family at bedside. 9334- Samples sent to hold. CBC added to assess platelets.

## 2019-04-25 VITALS
RESPIRATION RATE: 16 BRPM | BODY MASS INDEX: 25.02 KG/M2 | DIASTOLIC BLOOD PRESSURE: 86 MMHG | TEMPERATURE: 98 F | HEART RATE: 47 BPM | WEIGHT: 184.75 LBS | OXYGEN SATURATION: 98 % | HEIGHT: 72 IN | SYSTOLIC BLOOD PRESSURE: 136 MMHG

## 2019-04-25 PROBLEM — I25.10 CORONARY ARTERY DISEASE INVOLVING NATIVE CORONARY ARTERY: Status: ACTIVE | Noted: 2019-04-25

## 2019-04-25 LAB
APTT PPP: 67.1 SEC (ref 22.1–32)
THERAPEUTIC RANGE,PTTT: ABNORMAL SECS (ref 58–77)

## 2019-04-25 PROCEDURE — 85730 THROMBOPLASTIN TIME PARTIAL: CPT

## 2019-04-25 PROCEDURE — 74011250637 HC RX REV CODE- 250/637: Performed by: SPECIALIST

## 2019-04-25 PROCEDURE — 36415 COLL VENOUS BLD VENIPUNCTURE: CPT

## 2019-04-25 PROCEDURE — 74011250637 HC RX REV CODE- 250/637: Performed by: STUDENT IN AN ORGANIZED HEALTH CARE EDUCATION/TRAINING PROGRAM

## 2019-04-25 PROCEDURE — 74011250637 HC RX REV CODE- 250/637: Performed by: NURSE PRACTITIONER

## 2019-04-25 RX ORDER — ATORVASTATIN CALCIUM 40 MG/1
40 TABLET, FILM COATED ORAL
Qty: 30 TAB | Refills: 3 | Status: SHIPPED | OUTPATIENT
Start: 2019-04-25 | End: 2019-06-17 | Stop reason: SDUPTHER

## 2019-04-25 RX ORDER — GUAIFENESIN 100 MG/5ML
81 LIQUID (ML) ORAL DAILY
Qty: 90 TAB | Refills: 3 | Status: SHIPPED | OUTPATIENT
Start: 2019-04-26

## 2019-04-25 RX ORDER — METOPROLOL SUCCINATE 25 MG/1
25 TABLET, EXTENDED RELEASE ORAL DAILY
Qty: 30 TAB | Refills: 3 | Status: SHIPPED | OUTPATIENT
Start: 2019-04-26 | End: 2019-06-17 | Stop reason: SDUPTHER

## 2019-04-25 RX ADMIN — TICAGRELOR 90 MG: 90 TABLET ORAL at 07:23

## 2019-04-25 RX ADMIN — Medication 10 ML: at 07:23

## 2019-04-25 RX ADMIN — AMLODIPINE BESYLATE 5 MG: 5 TABLET ORAL at 08:18

## 2019-04-25 RX ADMIN — METOPROLOL SUCCINATE 25 MG: 25 TABLET, EXTENDED RELEASE ORAL at 08:19

## 2019-04-25 RX ADMIN — ASPIRIN 81 MG 81 MG: 81 TABLET ORAL at 08:18

## 2019-04-25 RX ADMIN — LISINOPRIL 10 MG: 10 TABLET ORAL at 08:19

## 2019-04-25 NOTE — DISCHARGE SUMMARY
Cardiology Discharge Summary     Patient ID:  Charis Pagan  [id number]  45 y.o.  1975    Admit Date: 4/22/2019    Discharge Date: 4/25/2019    Admitting Physician: Haider Kiran MD     Discharge Physician: Zay Bhatia MD    Admission Diagnoses:   NSTEMI (non-ST elevated myocardial infarction) [facility]) [I21.4]    Discharge Diagnoses: Active Problems:    NSTEMI (non-ST elevated myocardial infarction) ([address] (4/22/2019)      Coronary artery disease involving native coronary artery (4/25/2019)        Discharge Condition: good    Cardiology Procedures this Admission:  Left heart catheterization with PCI on 4/23/19  Findings:   L Main: large, no significant disease  LAD: large caliber, mild disease  LCx: large, supplies L-PL branches, OM 2 with ostial disease of 70% with immediate bifurcation into two branches, superior branch with 70% disease and smaller inferior 80% diseae  RCA: large caliber vessel, proximal  with hazy filling defect, distal vessel fills via collaterals   LVEDP:  10-12 mmhg   PCI:   Two vessel CAD with disease involving branches of OM and sub-acute occlusion of proximal RCA  2. Normal LVEDP  3. Successful PCI of proximal RCA     OM2 would be difficult to treat due to ostial OM disease with immediate bifurcation with disease involving both branches of OM2    Consults: none    Hospital Course: 44 y.o. Male with PMH of HTN, presented 4/22/19 with chief c/o chest pain. Noted to have NSTEMI with troponin peak of 1.65. Underwent success PCI/GALINA of proximal RCA. (see above) by Dr. Roselind Bamberger. As noted above, OM2 disease would be difficult to treat with PCI due to ostial OM with immediate bifurcation with diesase involving both branches of OM 2. Pt received 48 hours of heparin due to thrombus burden in distal vessel. He had no chest pain at discharge and was ambulatory. His HR was in 40's- 50's at discharge, Sinus bradycardia and he was asymptomatic.  Echo was normal.  He was discharged on ASA, high dose statin, Brillinta, Toprol XL as well as his home lisinopril/amlodipine. He will follow up with Dr. Fabiola Dangelo in 1 week (short pump office). I have seen and examined the patient and agree with N.P. assessment. Discussed again with patient results of cath and residual cad   we have discussed cardiac wellness    he remains completely asymptomatic  We will dc today and follow up in office  Echo with no gross rwma    Visit Vitals  /89 (BP 1 Location: Left arm, BP Patient Position: Head of bed elevated (Comment degrees))   Pulse (!) 44   Temp 97.7 °F (36.5 °C)   Resp 16   Ht 6' (1.829 m)   Wt 184 lb 11.9 oz (83.8 kg)   SpO2 96%   BMI 25.06 kg/m²       Physical Exam  Abdomen: soft, non-tender. Bowel sounds normal.   Extremities: no LE edema, + PP bilaterally. Rt radial site without active bleeding or hematoma  Heart: regular rate and rhythm, S1, S2 normal, no murmurs, clicks, rubs or gallops  Lungs: clear to auscultation bilaterally  Neck: supple, no JVD,   Neurologic: Grossly normal  Pulses: 2+ and symmetrical    Labs:   Recent Labs     04/24/19  0325 04/23/19  1655 04/22/19  1319   WBC 8.2 6.9 6.2   HGB 13.2 13.9 14.6   HCT 40.4 41.6 43.3    175 172     Recent Labs     04/23/19  0414 04/22/19  1319    139   K 3.9 3.7    106   CO2 27 26   GLU 98 106*   BUN 13 13   CREA 0.91 0.94   CA 8.8 9.2   MG 2.3  --    ALB  --  3.8   SGOT  --  30   ALT  --  39       Recent Labs     04/23/19  0414 04/22/19  2224 04/22/19  1801 04/22/19  1319   TROIQ 1.51* 1.47* 1.20* 1.65*       EKG:   CXR:   Other Diagnostic Tests:   04/22/19   ECHO ADULT COMPLETE 04/24/2019 4/24/2019    Narrative · Estimated left ventricular ejection fraction is 56 - 60%. Signed by: Rico Erickson MD       Device check:     Disposition: home  Patient Instructions:   Current Discharge Medication List      START taking these medications    Details   aspirin 81 mg chewable tablet Take 1 Tab by mouth daily.   Qty: 90 Tab, Refills: 3      atorvastatin (LIPITOR) 40 mg tablet Take 1 Tab by mouth nightly. Qty: 30 Tab, Refills: 3      metoprolol succinate (TOPROL-XL) 25 mg XL tablet Take 1 Tab by mouth daily. Qty: 30 Tab, Refills: 3      ticagrelor (BRILINTA) 90 mg tablet Take 1 Tab by mouth every twelve (12) hours every twelve (12) hours. Qty: 60 Tab, Refills: 11         CONTINUE these medications which have NOT CHANGED    Details   amLODIPine-benazepril (LOTREL) 5-10 mg per capsule Take 1 Cap by mouth daily. B.infantis-B.ani-B.long-B.bifi (PROBIOTIC 4X) 10-15 mg TbEC Take 1 Tab by mouth daily. cholecalciferol (VITAMIN D3) 1,000 unit tablet Take 1,000 Units by mouth daily. Reference discharge instructions provided by nursing for diet and activity.     Follow-up with   Future Appointments   Date Time Provider Marcy Mireles   5/2/2019 10:20 AM MD kristal Rausch   5/7/2019  3:00 PM Jamal Aguillon RN [facility]       Signed:  Suzan Berg MD

## 2019-04-25 NOTE — PROGRESS NOTES
2000: Bedside and Verbal shift change report given to Micaela Ramirez RN (oncoming nurse) by Rama Rhoades RN (offgoing nurse). Report included the following information SBAR, Kardex, Procedure Summary, Intake/Output, MAR, Recent Results and Med Rec Status. 0730: Bedside and Verbal shift change report given to Rama Rhoades RN (oncoming nurse) by Micaela Ramirez RN (offgoing nurse). Report included the following information SBAR, Kardex, ED Summary, Intake/Output, MAR, Recent Results and Med Rec Status. Problem: Unstable Angina/NSTEMI: Discharge Outcomes Goal: *Stable cardiac rhythm Outcome: Progressing Towards Goal 
Goal: *Lungs clear or at baseline Outcome: Progressing Towards Goal 
Goal: *Optimal pain control at patient's stated goal 
Outcome: Progressing Towards Goal 
Goal: *Anxiety reduced or absent Outcome: Progressing Towards Goal 
Goal: *Understands and describes signs and symptoms to report to providers(Stroke Metric) Outcome: Progressing Towards Goal 
  
Problem: Falls - Risk of 
Goal: *Absence of Falls Description Document Kvng Costa Fall Risk and appropriate interventions in the flowsheet.  
Outcome: Progressing Towards Goal

## 2019-04-25 NOTE — PROGRESS NOTES
0730:  Bedside shift change report given to Alejandrina VILLAGRAN (oncoming nurse) by Rennie Bernheim (offgoing nurse). Report included the following information SBAR, Kardex, MAR and Recent Results. 1158: I have reviewed discharge instructions with the patient and spouse. The patient and spouse verbalized understanding. Discharge medications reviewed with patient and spouse and appropriate educational materials and side effects teaching were provided. Medication education sheets given to patient on all new medications and gone over in detail. All questions answered. Pt verbalized understanding.

## 2019-04-25 NOTE — PROGRESS NOTES
CM met with patient at bedside- again offered [facility] for NSTEMI, patient politely declined. The patient denied having concerns for transition home today, patient's spouse will transport home. CM noted plan to start Brilinta- CM provided $5.00/month coupon card as well as $18 for three-month supply at bedside for medication. Patient is independent, no needs or concerns identified at this time. ZENON Fisher CM contacted Care Management Specialist for follow-up PCP appointment.  ZENON Fisher

## 2019-04-25 NOTE — DISCHARGE INSTRUCTIONS
Radial Cardiac Catheterization/Angiography Discharge Instructions    It is normal to feel tired the first couple days. Take it easy and follow the physicians instructions. CHECK THE CATHETER INSERTION SITE DAILY:    Remove the wrist dressing 24 hours after the procedure. You may shower 24 hours after the procedure. Wash with soap and water and pat dry. Gentle cleaning of the site with soap and water is sufficient, cover with a dry clean dressing or bandage. Do not apply creams or powders to the area. No soaking the wrist for 3 days. Leave the puncture site open to air after 24 hours post-procedure. CALL THE PHYSICIANS:     If the site becomes red, swollen or feels warm to the touch  If there is bleeding or drainage or if there is unusual pain at the radial site. If there is any minor oozing, you may apply a band-aid and remove after 12 hours. If the bleeding continues, hold pressure with the middle finger against the puncture site and the thumb against the back of the wrist,call 911 to be transported to the hospital.  DO NOT DRIVE YOURSELF, [place] [id number]. ACTIVITY:   For the first 24 hours do not manipulate the wrist.  No lifting, pushing or pulling over 3-5 pounds with the affected wrist for 7 daysand no straining the insertion site. Do not life grocery bags or the garbage can, do not run the vacuum  or  for 7 days. Start with short walks as in the hospital and gradually increase as tolerated each day. It is recommended to walk 30 minutes 5-7 days per week. Follow your physicians instructions on activity. Avoid walking outside in extremes of heat or cold. Walk inside when it is cold and windy or hot and humid. Things to keep in mind:  No driving for at least 24 hours, or as designated by your physician. Limit the number of times you go up and down the stairs  Take rests and pace yourself with activity.   Be careful and do not strain with bowel movements. MEDICATIONS:    Take all medications as prescribed  Call your physician if you have any questions  Keep an updated list of your medications with you at all times and give a list to your physician and pharmacist    SIGNS AND SYMPTOMS:   Be cautious of symptoms of angina or recurrent symptoms such as chest discomfort, unusual shortness of breath or fatigue. These could be symptoms of restenosis, a new blockage or a heart attack. If your symptoms are relieved with rest it is still recommended that you notify your physician of recurrent chest pain or discomfort. For CHEST PAIN or symptoms of angina not relieved with rest:  If the discomfort is not relieved with rest, and you have been prescribed Nitroglycerin, take as directed (taken under the tongue, one at a time 5 minutes apart for a total of 3 doses). If the discomfort is not relieved after the 3rd nitroglycerin, call 911. If you have not been prescribed Nitroglycerin  and your chest discomfort is not relieved with rest, call 911. AFTER CARE:   Follow up with your physician as instructed. Follow a heart healthy diet with proper portion control, daily stress management, daily exercise, blood pressure and cholesterol control , and smoking cessation.     You may not return to work until cleared by Dr. Arnulfo Georges at next weeks appointment  [facility]   Date Time Provider Marcy Mireles   5/2/2019 10:20 AM MD Adam Soria   5/7/2019  3:00 PM [facility]

## 2019-04-25 NOTE — CARDIO/PULMONARY
Cardiac Rehab: Education material at the bedside. Met with Eleonora Ibrahim to answer an additional questions prior to discharge. Patient verbalized he has no questions at this time. Confirmed his cardiac rehab appointment for 5/7/19 at 3 pm and that appointment information is on the AVS. Eleonora Ibrahim verbalized understanding and is looking forward to going home today.  Charlotte Rojas RN

## 2019-05-02 ENCOUNTER — OFFICE VISIT (OUTPATIENT)
Dept: CARDIOLOGY CLINIC | Age: 44
End: 2019-05-02

## 2019-05-02 VITALS
SYSTOLIC BLOOD PRESSURE: 135 MMHG | BODY MASS INDEX: 24.65 KG/M2 | WEIGHT: 182 LBS | HEART RATE: 55 BPM | DIASTOLIC BLOOD PRESSURE: 83 MMHG | OXYGEN SATURATION: 100 % | RESPIRATION RATE: 14 BRPM | HEIGHT: 72 IN

## 2019-05-02 DIAGNOSIS — I25.119 CORONARY ARTERY DISEASE INVOLVING NATIVE CORONARY ARTERY OF NATIVE HEART WITH ANGINA PECTORIS (HCC): Primary | ICD-10-CM

## 2019-05-02 DIAGNOSIS — R00.1 BRADYCARDIA: ICD-10-CM

## 2019-05-02 NOTE — PATIENT INSTRUCTIONS
Have blood work drawn (FASTING)    Stress Cardiolite in 3-4 weeks    Follow up with Mi Caballero after tests

## 2019-05-02 NOTE — PROGRESS NOTES
HISTORY OF PRESENT ILLNESS  Navjot Samaniego is a 44 y.o. male. 44 y.o. Male with PMH of HTN, presented 4/22/19 with chief c/o chest pain. Noted to have NSTEMI with troponin peak of 1.65. Underwent success PCI/GALINA of proximal RCA. Left heart catheterization with PCI on 4/23/19  Findings:   L Main: large, no significant disease  LAD: large caliber, mild disease  LCx: large, supplies L-PL branches, OM 2 with ostial disease of 70% with immediate bifurcation into two branches, superior branch with 70% disease and smaller inferior 80% diseae  RCA: large caliber vessel, proximal  with hazy filling defect, distal vessel fills via collaterals   LVEDP:  10-12 mmhg   PCI:   Two vessel CAD with disease involving branches of OM and sub-acute occlusion of proximal RCA  2.  Normal LVEDP  3.  Successful PCI of proximal RCA  04/22/19   ECHO ADULT COMPLETE 04/24/2019 4/24/2019    Narrative · Estimated left ventricular ejection fraction is 56 - 60%. Signed by: Alison Mei MD               HPI  Doing well no cp or sob or palpitations  Review of Systems   Constitutional: Negative. Respiratory: Negative. Cardiovascular: Negative. Physical Exam   Physical Exam   Blood pressure 135/83, pulse (!) 55, resp. rate 14, height 6' (1.829 m), weight 182 lb (82.6 kg), SpO2 100 %. Constitutional: He is oriented to person, place, and time. He appears well-developed and well-nourished. No distress. HENT: Head: Normocephalic. Eyes: No scleral icterus. Neck: Normal range of motion. Neck supple. No JVD present. No tracheal deviation present. Cardiovascular: Normal rate, regular rhythm, normal heart sounds and intact distal pulses. Exam reveals no gallop and no friction rub. No murmur heard. Pulmonary/Chest: Effort normal and breath sounds normal. No stridor. No respiratory distress, wheezes or  rales. Abdominal: He exhibits no distension. Musculoskeletal: He exhibits no edema.    Neurological: He is alert and oriented to person, place, and time. Coordination normal.   Skin: Skin is warm. No rash noted. Not diaphoretic. No erythema. Psychiatric:  Normal mood and affect. Behavior is normal.     Lab Results   Component Value Date/Time    Cholesterol, total 169 04/23/2019 04:58 PM    HDL Cholesterol 54 04/23/2019 04:58 PM    LDL, calculated 101.4 (H) 04/23/2019 04:58 PM    VLDL, calculated 13.6 04/23/2019 04:58 PM    Triglyceride 68 04/23/2019 04:58 PM    CHOL/HDL Ratio 3.1 04/23/2019 04:58 PM     Lab Results   Component Value Date/Time    ALT (SGPT) 39 04/22/2019 01:19 PM    AST (SGOT) 30 04/22/2019 01:19 PM    Alk. phosphatase 92 04/22/2019 01:19 PM    Bilirubin, total 0.6 04/22/2019 01:19 PM     Current Outpatient Medications on File Prior to Visit   Medication Sig Dispense Refill    aspirin 81 mg chewable tablet Take 1 Tab by mouth daily. 90 Tab 3    atorvastatin (LIPITOR) 40 mg tablet Take 1 Tab by mouth nightly. 30 Tab 3    metoprolol succinate (TOPROL-XL) 25 mg XL tablet Take 1 Tab by mouth daily. 30 Tab 3    ticagrelor (BRILINTA) 90 mg tablet Take 1 Tab by mouth every twelve (12) hours every twelve (12) hours. 60 Tab 11    amLODIPine-benazepril (LOTREL) 5-10 mg per capsule Take 1 Cap by mouth daily.  B.infantis-B.ani-B.long-B.bifi (PROBIOTIC 4X) 10-15 mg TbEC Take 1 Tab by mouth daily.  cholecalciferol (VITAMIN D3) 1,000 unit tablet Take 1,000 Units by mouth daily. No current facility-administered medications on file prior to visit.           ASSESSMENT and PLAN  CAD: s/p nstemi on 4/19 with stenosis and  thrombus of rca, s/p successful pci with luz of rca but residual om stenosis ( 2 branches and as per Dr. Betts Search"  OM2 disease would be difficult to treat with PCI due to ostial OM with immediate bifurcation with disease involving both branches of OM 2. \"    ECG with NSR/SB and nt in inflat leads      Discussed options continue asa/brylinta toprol and statin  proceed with nuclear stress test in 3-4 weeks to determine significance of om stenosis  'discussed exercise otc meds nutrition caffeine  and cardiac wellness    HTN:well controlled    HLD: ldl goal <70 check in 6/19    See him back after stress test

## 2019-05-02 NOTE — PROGRESS NOTES
Visit Vitals  /83 (BP 1 Location: Left arm, BP Patient Position: Sitting)   Pulse (!) 55   Resp 14   Ht 6' (1.829 m)   Wt 182 lb (82.6 kg)   SpO2 100%   BMI 24.68 kg/m²     Verified patient with two types of identifiers. Verified pharmacy with patient. Verified medications with the patient. Discontinued medications not current per Dr. Stephania Bergman.

## 2019-05-07 ENCOUNTER — HOSPITAL ENCOUNTER (OUTPATIENT)
Dept: CARDIAC REHAB | Age: 44
Discharge: HOME | End: 2019-05-07
Payer: COMMERCIAL

## 2019-05-07 VITALS — HEIGHT: 72 IN | WEIGHT: 185.6 LBS | BODY MASS INDEX: 25.14 KG/M2

## 2019-05-07 PROCEDURE — 93798 PHYS/QHP OP CAR RHAB W/ECG: CPT

## 2019-05-07 NOTE — CARDIO/PULMONARY
Razia Kinney   44 y.o.    presented to cardiac wellness for orientation and exercise tolerance test today with a primary diagnosis of a NSTEMI and 1 stent. Razia Kinney has a history of bradycardia several years ago, which is when he first went to Dr. Fred Matthews. Cardiac risk factors include family history, hypertension and these were reviewed with the patient and his wife. Razia Kinney is  and lives with his wife. They do quite a few outdoor activities together - biking, hiking, sailing, running. PHQ9, depression score, is 0 and this is considered normal.  Patient denied chest pain or SOB during 6 minute walk and was in SB/SR without ectopy. His resting HR is in the 40's on his beta blocker. Navjot Samaniego will attend a 60 minute class once a week and exercise 2 days a week in cardiac wellness. EF is 56-60%. Education manual given to patient.

## 2019-05-09 ENCOUNTER — TELEPHONE (OUTPATIENT)
Dept: CARDIAC REHAB | Age: 44
End: 2019-05-09

## 2019-05-09 ENCOUNTER — APPOINTMENT (OUTPATIENT)
Dept: CARDIAC REHAB | Age: 44
End: 2019-05-09
Payer: COMMERCIAL

## 2019-05-09 NOTE — TELEPHONE ENCOUNTER
5/9/2019 Cardiac Wellness: Mr. Shikha Dawn called to cancel today's appointment d/t left voicemail message about conflicting appointment. Will return for next appointment.  Stanislav Murillo

## 2019-05-13 ENCOUNTER — APPOINTMENT (OUTPATIENT)
Dept: CARDIAC REHAB | Age: 44
End: 2019-05-13
Payer: COMMERCIAL

## 2019-05-16 ENCOUNTER — HOSPITAL ENCOUNTER (OUTPATIENT)
Dept: CARDIAC REHAB | Age: 44
Discharge: HOME | End: 2019-05-16
Payer: COMMERCIAL

## 2019-05-16 VITALS — BODY MASS INDEX: 24.75 KG/M2 | WEIGHT: 182.5 LBS

## 2019-05-16 PROCEDURE — 93797 PHYS/QHP OP CAR RHAB WO ECG: CPT | Performed by: DIETITIAN, REGISTERED

## 2019-05-16 PROCEDURE — 93798 PHYS/QHP OP CAR RHAB W/ECG: CPT

## 2019-05-18 LAB
ALBUMIN SERPL-MCNC: 4.7 G/DL (ref 3.5–5.5)
ALP SERPL-CCNC: 91 IU/L (ref 39–117)
ALT SERPL-CCNC: 18 IU/L (ref 0–44)
AST SERPL-CCNC: 17 IU/L (ref 0–40)
BILIRUB DIRECT SERPL-MCNC: 0.24 MG/DL (ref 0–0.4)
BILIRUB SERPL-MCNC: 0.8 MG/DL (ref 0–1.2)
CHOLEST SERPL-MCNC: 114 MG/DL (ref 100–199)
HDLC SERPL-MCNC: 44 MG/DL
INTERPRETATION, 910389: NORMAL
LDLC SERPL CALC-MCNC: 56 MG/DL (ref 0–99)
PROT SERPL-MCNC: 7.4 G/DL (ref 6–8.5)
TRIGL SERPL-MCNC: 69 MG/DL (ref 0–149)
VLDLC SERPL CALC-MCNC: 14 MG/DL (ref 5–40)

## 2019-05-20 ENCOUNTER — HOSPITAL ENCOUNTER (OUTPATIENT)
Dept: CARDIAC REHAB | Age: 44
Discharge: HOME | End: 2019-05-20
Payer: COMMERCIAL

## 2019-05-20 VITALS — BODY MASS INDEX: 24.85 KG/M2 | WEIGHT: 183.2 LBS

## 2019-05-20 PROCEDURE — 93798 PHYS/QHP OP CAR RHAB W/ECG: CPT | Performed by: DIETITIAN, REGISTERED

## 2019-05-23 ENCOUNTER — APPOINTMENT (OUTPATIENT)
Dept: CARDIAC REHAB | Age: 44
End: 2019-05-23
Payer: COMMERCIAL

## 2019-05-29 ENCOUNTER — HOSPITAL ENCOUNTER (OUTPATIENT)
Dept: CARDIAC REHAB | Age: 44
Discharge: HOME | End: 2019-05-29
Payer: COMMERCIAL

## 2019-05-29 VITALS — BODY MASS INDEX: 24.87 KG/M2 | WEIGHT: 183.4 LBS

## 2019-05-29 PROCEDURE — 93798 PHYS/QHP OP CAR RHAB W/ECG: CPT

## 2019-05-30 ENCOUNTER — APPOINTMENT (OUTPATIENT)
Dept: CARDIAC REHAB | Age: 44
End: 2019-05-30
Payer: COMMERCIAL

## 2019-05-30 ENCOUNTER — OFFICE VISIT (OUTPATIENT)
Dept: CARDIOLOGY CLINIC | Age: 44
End: 2019-05-30

## 2019-05-30 VITALS
BODY MASS INDEX: 24.92 KG/M2 | RESPIRATION RATE: 14 BRPM | HEIGHT: 72 IN | SYSTOLIC BLOOD PRESSURE: 131 MMHG | DIASTOLIC BLOOD PRESSURE: 81 MMHG | OXYGEN SATURATION: 99 % | HEART RATE: 55 BPM | WEIGHT: 184 LBS

## 2019-05-30 DIAGNOSIS — I25.119 CORONARY ARTERY DISEASE INVOLVING NATIVE CORONARY ARTERY OF NATIVE HEART WITH ANGINA PECTORIS (HCC): Primary | ICD-10-CM

## 2019-05-30 DIAGNOSIS — R00.1 BRADYCARDIA: ICD-10-CM

## 2019-05-30 NOTE — PROGRESS NOTES
Visit Vitals  /81 (BP 1 Location: Left arm, BP Patient Position: Sitting)   Pulse (!) 55   Resp 14   Ht 6' (1.829 m)   Wt 184 lb (83.5 kg)   SpO2 99%   BMI 24.95 kg/m²     Verified patient with two types of identifiers. Verified pharmacy with patient. Verified medications with the patient. Discontinued medications not current per Dr. Vesna Johnson.

## 2019-05-30 NOTE — PROGRESS NOTES
HISTORY OF PRESENT ILLNESS  Navjot Michelle is a 44 y.o. male. with PMH of HTN, presented 4/22/19 with chief c/o chest pain. Noted to have NSTEMI with troponin peak of 1.65. Underwent success PCI/GALINA of proximal RCA.   Left heart catheterization with PCI on 4/23/19  Findings:   L Main: large, no significant disease  LAD: large caliber, mild disease  LCx: large, supplies L-PL branches, OM 2 with ostial disease of 70% with immediate bifurcation into two branches, superior branch with 70% disease and smaller inferior 80% diseae  RCA: large caliber vessel, proximal  with hazy filling defect, distal vessel fills via collaterals   LVEDP:  10-12 mmhg   PCI:   Two vessel CAD with disease involving branches of OM and sub-acute occlusion of proximal RCA  2.  Normal LVEDP  3.  Successful PCI of proximal RCA       04/22/19   ECHO ADULT COMPLETE 04/24/2019 4/24/2019     Narrative · Estimated left ventricular ejection fraction is 56 - 60%.     Signed by: Rajwinder Rushing MD           HPI  Doing well no complaints started to exercise with no issues   no cp or sob or palpitations reported  Review of Systems   Constitutional: Negative. Respiratory: Negative. Cardiovascular: Negative. Physical Exam  Visit Vitals  /81 (BP 1 Location: Left arm, BP Patient Position: Sitting)   Pulse (!) 55   Resp 14   Ht 6' (1.829 m)   Wt 184 lb (83.5 kg)   SpO2 99%   BMI 24.95 kg/m²     Lab Results   Component Value Date/Time    Cholesterol, total 114 05/17/2019 09:14 AM    HDL Cholesterol 44 05/17/2019 09:14 AM    LDL, calculated 56 05/17/2019 09:14 AM    VLDL, calculated 14 05/17/2019 09:14 AM    Triglyceride 69 05/17/2019 09:14 AM    CHOL/HDL Ratio 3.1 04/23/2019 04:58 PM     Lab Results   Component Value Date/Time    ALT (SGPT) 18 05/17/2019 09:14 AM    AST (SGOT) 17 05/17/2019 09:14 AM    Alk.  phosphatase 91 05/17/2019 09:14 AM    Bilirubin, direct 0.24 05/17/2019 09:14 AM    Bilirubin, total 0.8 05/17/2019 09:14 AM ASSESSMENT and PLAN  CAD: s/p nstemi on 4/19 with stenosis and  thrombus of rca, s/p successful pci with luz of rca but residual om stenosis ( 2 branches and as per Dr. Gonzalo Grimes"  OM2 disease would be difficult to treat with PCI due to ostial OM with immediate bifurcation with disease involving both branches of OM 2.  \"     previous ECG with NSR/SB and nt in inflat leads  Discussed results of nuclear stress test with mild ischemia essentially in the same territory of om  Discussed options   we will ask Dr. Louise White to review and determine if om pci an option   for now continue medical rx only       continue asa/brylinta toprol and statin    'discussed exercise otc meds nutrition caffeine  and cardiac wellness  I have asked him to hold off running [place] half marathon next month     HTN:well controlled     HLD: ldl goal <70 well controlled!     See him back in 4 months but we will discuss over the phone Dr. Peggy Saldaña

## 2019-05-31 ENCOUNTER — HOSPITAL ENCOUNTER (OUTPATIENT)
Dept: CARDIAC REHAB | Age: 44
Discharge: HOME | End: 2019-05-31
Payer: COMMERCIAL

## 2019-05-31 VITALS — WEIGHT: 182 LBS | BODY MASS INDEX: 24.68 KG/M2

## 2019-05-31 PROCEDURE — 93798 PHYS/QHP OP CAR RHAB W/ECG: CPT

## 2019-06-03 ENCOUNTER — HOSPITAL ENCOUNTER (OUTPATIENT)
Dept: CARDIAC REHAB | Age: 44
Discharge: HOME | End: 2019-06-03
Payer: COMMERCIAL

## 2019-06-03 VITALS — WEIGHT: 182.2 LBS | BODY MASS INDEX: 24.71 KG/M2

## 2019-06-03 PROCEDURE — 93798 PHYS/QHP OP CAR RHAB W/ECG: CPT

## 2019-06-06 ENCOUNTER — APPOINTMENT (OUTPATIENT)
Dept: CARDIAC REHAB | Age: 44
End: 2019-06-06
Payer: COMMERCIAL

## 2019-06-10 ENCOUNTER — HOSPITAL ENCOUNTER (OUTPATIENT)
Dept: CARDIAC REHAB | Age: 44
Discharge: HOME | End: 2019-06-10
Payer: COMMERCIAL

## 2019-06-10 VITALS — WEIGHT: 183 LBS | BODY MASS INDEX: 24.82 KG/M2

## 2019-06-10 PROCEDURE — 93798 PHYS/QHP OP CAR RHAB W/ECG: CPT

## 2019-06-13 ENCOUNTER — APPOINTMENT (OUTPATIENT)
Dept: CARDIAC REHAB | Age: 44
End: 2019-06-13
Payer: COMMERCIAL

## 2019-06-13 ENCOUNTER — HOSPITAL ENCOUNTER (OUTPATIENT)
Dept: CARDIAC REHAB | Age: 44
Discharge: HOME | End: 2019-06-13
Payer: COMMERCIAL

## 2019-06-13 VITALS — WEIGHT: 181.8 LBS | BODY MASS INDEX: 24.66 KG/M2

## 2019-06-13 PROCEDURE — 93798 PHYS/QHP OP CAR RHAB W/ECG: CPT

## 2019-06-17 ENCOUNTER — HOSPITAL ENCOUNTER (OUTPATIENT)
Dept: CARDIAC REHAB | Age: 44
Discharge: HOME | End: 2019-06-17
Payer: COMMERCIAL

## 2019-06-17 VITALS — BODY MASS INDEX: 24.85 KG/M2 | WEIGHT: 183.2 LBS

## 2019-06-17 PROCEDURE — 93798 PHYS/QHP OP CAR RHAB W/ECG: CPT

## 2019-06-17 RX ORDER — ATORVASTATIN CALCIUM 40 MG/1
40 TABLET, FILM COATED ORAL
Qty: 30 TAB | Refills: 3 | Status: SHIPPED | OUTPATIENT
Start: 2019-06-17 | End: 2019-12-17 | Stop reason: SDUPTHER

## 2019-06-17 RX ORDER — METOPROLOL SUCCINATE 25 MG/1
25 TABLET, EXTENDED RELEASE ORAL DAILY
Qty: 30 TAB | Refills: 3 | Status: SHIPPED | OUTPATIENT
Start: 2019-06-17 | End: 2019-09-26 | Stop reason: DRUGHIGH

## 2019-06-27 ENCOUNTER — HOSPITAL ENCOUNTER (OUTPATIENT)
Dept: CARDIAC REHAB | Age: 44
Discharge: HOME | End: 2019-06-27
Payer: COMMERCIAL

## 2019-06-27 VITALS — WEIGHT: 185 LBS | BODY MASS INDEX: 25.09 KG/M2

## 2019-06-27 PROCEDURE — 93797 PHYS/QHP OP CAR RHAB WO ECG: CPT | Performed by: DIETITIAN, REGISTERED

## 2019-06-27 PROCEDURE — 93798 PHYS/QHP OP CAR RHAB W/ECG: CPT

## 2019-06-28 ENCOUNTER — HOSPITAL ENCOUNTER (OUTPATIENT)
Dept: CARDIAC REHAB | Age: 44
Discharge: HOME | End: 2019-06-28
Payer: COMMERCIAL

## 2019-06-28 VITALS — WEIGHT: 186.2 LBS | BODY MASS INDEX: 25.25 KG/M2

## 2019-06-28 PROCEDURE — 93798 PHYS/QHP OP CAR RHAB W/ECG: CPT

## 2019-07-01 ENCOUNTER — TELEPHONE (OUTPATIENT)
Dept: CARDIAC REHAB | Age: 44
End: 2019-07-01

## 2019-07-01 ENCOUNTER — APPOINTMENT (OUTPATIENT)
Dept: CARDIAC REHAB | Age: 44
End: 2019-07-01
Payer: COMMERCIAL

## 2019-07-02 ENCOUNTER — HOSPITAL ENCOUNTER (OUTPATIENT)
Dept: CARDIAC REHAB | Age: 44
Discharge: HOME | End: 2019-07-02
Payer: COMMERCIAL

## 2019-07-02 VITALS — WEIGHT: 186.2 LBS | BODY MASS INDEX: 25.25 KG/M2

## 2019-07-02 PROCEDURE — 93798 PHYS/QHP OP CAR RHAB W/ECG: CPT

## 2019-07-08 ENCOUNTER — HOSPITAL ENCOUNTER (OUTPATIENT)
Dept: CARDIAC REHAB | Age: 44
Discharge: HOME | End: 2019-07-08
Payer: COMMERCIAL

## 2019-07-08 VITALS — BODY MASS INDEX: 25.14 KG/M2 | WEIGHT: 185.4 LBS

## 2019-07-08 PROCEDURE — 93798 PHYS/QHP OP CAR RHAB W/ECG: CPT

## 2019-07-11 ENCOUNTER — HOSPITAL ENCOUNTER (OUTPATIENT)
Dept: CARDIAC REHAB | Age: 44
Discharge: HOME | End: 2019-07-11
Payer: COMMERCIAL

## 2019-07-11 ENCOUNTER — HOSPITAL ENCOUNTER (OUTPATIENT)
Dept: CARDIAC REHAB | Age: 44
End: 2019-07-11
Payer: COMMERCIAL

## 2019-07-11 VITALS — WEIGHT: 185.5 LBS | BODY MASS INDEX: 25.16 KG/M2

## 2019-07-11 PROCEDURE — 93798 PHYS/QHP OP CAR RHAB W/ECG: CPT

## 2019-07-15 ENCOUNTER — APPOINTMENT (OUTPATIENT)
Dept: CARDIAC REHAB | Age: 44
End: 2019-07-15
Payer: COMMERCIAL

## 2019-07-18 ENCOUNTER — APPOINTMENT (OUTPATIENT)
Dept: CARDIAC REHAB | Age: 44
End: 2019-07-18
Payer: COMMERCIAL

## 2019-07-22 ENCOUNTER — HOSPITAL ENCOUNTER (OUTPATIENT)
Dept: CARDIAC REHAB | Age: 44
Discharge: HOME | End: 2019-07-22
Payer: COMMERCIAL

## 2019-07-22 VITALS — WEIGHT: 185.5 LBS | BODY MASS INDEX: 25.16 KG/M2

## 2019-07-22 PROCEDURE — 93798 PHYS/QHP OP CAR RHAB W/ECG: CPT

## 2019-07-24 ENCOUNTER — HOSPITAL ENCOUNTER (OUTPATIENT)
Dept: CARDIAC REHAB | Age: 44
Discharge: HOME | End: 2019-07-24
Payer: COMMERCIAL

## 2019-07-24 VITALS — WEIGHT: 185 LBS | BODY MASS INDEX: 25.09 KG/M2

## 2019-07-24 PROCEDURE — 93798 PHYS/QHP OP CAR RHAB W/ECG: CPT

## 2019-07-25 ENCOUNTER — APPOINTMENT (OUTPATIENT)
Dept: CARDIAC REHAB | Age: 44
End: 2019-07-25
Payer: COMMERCIAL

## 2019-07-29 ENCOUNTER — HOSPITAL ENCOUNTER (OUTPATIENT)
Dept: CARDIAC REHAB | Age: 44
Discharge: HOME | End: 2019-07-29
Payer: COMMERCIAL

## 2019-07-29 VITALS — WEIGHT: 186.2 LBS | BODY MASS INDEX: 25.25 KG/M2

## 2019-07-29 PROCEDURE — 93798 PHYS/QHP OP CAR RHAB W/ECG: CPT

## 2019-08-01 ENCOUNTER — APPOINTMENT (OUTPATIENT)
Dept: CARDIAC REHAB | Age: 44
End: 2019-08-01
Payer: COMMERCIAL

## 2019-08-08 ENCOUNTER — HOSPITAL ENCOUNTER (OUTPATIENT)
Dept: CARDIAC REHAB | Age: 44
Discharge: HOME | End: 2019-08-08
Payer: COMMERCIAL

## 2019-08-08 VITALS — BODY MASS INDEX: 25.27 KG/M2 | WEIGHT: 186.3 LBS

## 2019-08-08 PROCEDURE — 93798 PHYS/QHP OP CAR RHAB W/ECG: CPT

## 2019-08-09 ENCOUNTER — APPOINTMENT (OUTPATIENT)
Dept: CARDIAC REHAB | Age: 44
End: 2019-08-09
Payer: COMMERCIAL

## 2019-08-12 ENCOUNTER — TELEPHONE (OUTPATIENT)
Dept: CARDIAC REHAB | Age: 44
End: 2019-08-12

## 2019-08-12 ENCOUNTER — APPOINTMENT (OUTPATIENT)
Dept: CARDIAC REHAB | Age: 44
End: 2019-08-12
Payer: COMMERCIAL

## 2019-08-16 ENCOUNTER — APPOINTMENT (OUTPATIENT)
Dept: CARDIAC REHAB | Age: 44
End: 2019-08-16
Payer: COMMERCIAL

## 2019-08-22 ENCOUNTER — HOSPITAL ENCOUNTER (OUTPATIENT)
Dept: CARDIAC REHAB | Age: 44
Discharge: HOME | End: 2019-08-22
Payer: COMMERCIAL

## 2019-08-22 VITALS — BODY MASS INDEX: 25.36 KG/M2 | WEIGHT: 187 LBS

## 2019-08-22 PROCEDURE — 93798 PHYS/QHP OP CAR RHAB W/ECG: CPT

## 2019-08-23 ENCOUNTER — APPOINTMENT (OUTPATIENT)
Dept: CARDIAC REHAB | Age: 44
End: 2019-08-23
Payer: COMMERCIAL

## 2019-08-26 ENCOUNTER — HOSPITAL ENCOUNTER (OUTPATIENT)
Dept: CARDIAC REHAB | Age: 44
Discharge: HOME | End: 2019-08-26
Payer: COMMERCIAL

## 2019-08-26 VITALS — WEIGHT: 186.2 LBS | BODY MASS INDEX: 25.25 KG/M2

## 2019-08-26 PROCEDURE — 93798 PHYS/QHP OP CAR RHAB W/ECG: CPT

## 2019-09-05 ENCOUNTER — HOSPITAL ENCOUNTER (OUTPATIENT)
Dept: CARDIAC REHAB | Age: 44
Discharge: HOME | End: 2019-09-05
Payer: COMMERCIAL

## 2019-09-05 VITALS — WEIGHT: 181.4 LBS | BODY MASS INDEX: 24.6 KG/M2

## 2019-09-05 PROCEDURE — 93798 PHYS/QHP OP CAR RHAB W/ECG: CPT

## 2019-09-23 ENCOUNTER — HOSPITAL ENCOUNTER (OUTPATIENT)
Dept: CARDIAC REHAB | Age: 44
Discharge: HOME | End: 2019-09-23
Payer: COMMERCIAL

## 2019-09-23 VITALS — WEIGHT: 183.5 LBS | BODY MASS INDEX: 24.89 KG/M2

## 2019-09-23 PROCEDURE — 93798 PHYS/QHP OP CAR RHAB W/ECG: CPT

## 2019-09-26 ENCOUNTER — OFFICE VISIT (OUTPATIENT)
Dept: CARDIOLOGY CLINIC | Age: 44
End: 2019-09-26

## 2019-09-26 VITALS
BODY MASS INDEX: 25.33 KG/M2 | SYSTOLIC BLOOD PRESSURE: 144 MMHG | HEIGHT: 72 IN | OXYGEN SATURATION: 98 % | RESPIRATION RATE: 16 BRPM | WEIGHT: 187 LBS | HEART RATE: 54 BPM | DIASTOLIC BLOOD PRESSURE: 78 MMHG

## 2019-09-26 DIAGNOSIS — R00.1 BRADYCARDIA: ICD-10-CM

## 2019-09-26 DIAGNOSIS — I25.119 CORONARY ARTERY DISEASE INVOLVING NATIVE CORONARY ARTERY OF NATIVE HEART WITH ANGINA PECTORIS (HCC): Primary | ICD-10-CM

## 2019-09-26 RX ORDER — METOPROLOL SUCCINATE 25 MG/1
25 TABLET, EXTENDED RELEASE ORAL DAILY
COMMUNITY
End: 2020-03-19

## 2019-09-26 NOTE — PATIENT INSTRUCTIONS
Decrease Toprol 12.5 mg daily    Have blood work drawn next month (FASTING)    Follow up with Chapo Boyce in 6 months

## 2019-09-26 NOTE — PROGRESS NOTES
Karlee Davis is a 38 y.o. male    Chief Complaint   Patient presents with    Follow-up     4 mo f/u    Coronary Artery Disease    Slow Heart Rate       Visit Vitals  /78 (BP 1 Location: Left arm, BP Patient Position: Sitting)   Pulse (!) 54   Resp 16   Ht 6' (1.829 m)   Wt 187 lb (84.8 kg)   SpO2 98%   BMI 25.36 kg/m²       1. Have you been to the ER, urgent care clinic since your last visit? Hospitalized since your last visit? NO    2. Have you seen or consulted any other health care providers outside of the [address] [place] [address] since your last visit? Include any pap smears or colon screening.   NO

## 2019-09-26 NOTE — PROGRESS NOTES
HISTORY OF PRESENT ILLNESS  Navjot Burnett is a 38 y.o. male. with PMH of HTN, presented 4/22/19 with chief c/o chest pain. Noted to have NSTEMI with troponin peak of 1.65. Underwent success PCI/GALINA of proximal RCA.   Left heart catheterization with PCI on 4/23/19  Findings:   L Main: large, no significant disease  LAD: large caliber, mild disease  LCx: large, supplies L-PL branches, OM 2 with ostial disease of 70% with immediate bifurcation into two branches, superior branch with 70% disease and smaller inferior 80% diseae  RCA: large caliber vessel, proximal  with hazy filling defect, distal vessel fills via collaterals   LVEDP:  10-12 mmhg   PCI:   Two vessel CAD with disease involving branches of OM and sub-acute occlusion of proximal RCA  2.  Normal LVEDP  3.  Successful PCI of proximal RCA          04/22/19   ECHO ADULT COMPLETE 04/24/2019 4/24/2019     Narrative · Estimated left ventricular ejection fraction is 56 - 60%.         Signed by: Dereck Ruelas MD        HPI  He is doing very well and is completely asymptomatic. He is very active mountain biking for 70 miles with no issues. He continues to exercise in other ways as well with no issues of chest pain chest pressure shortness of breath palpitation presyncopal syncopal episode. He denies any dizziness. He did have one day since I saw him last where he felt slowing down. Review of Systems   Respiratory: Negative. Cardiovascular: Negative. Physical Exam   Physical Exam   Blood pressure 144/78, pulse (!) 54, resp. rate 16, height 6' (1.829 m), weight 187 lb (84.8 kg), SpO2 98 %. Constitutional: He is oriented to person, place, and time. He appears well-developed and well-nourished. No distress. HENT: Head: Normocephalic. Eyes: No scleral icterus. Neck: Normal range of motion. Neck supple. No JVD present. No tracheal deviation present. Cardiovascular: Normal rate, regular rhythm, normal heart sounds and intact distal pulses.   Exam reveals no gallop and no friction rub. No murmur heard. Pulmonary/Chest: Effort normal and breath sounds normal. No stridor. No respiratory distress, wheezes or  rales. Abdominal: He exhibits no distension. Musculoskeletal: He exhibits no edema. Neurological: He is alert and oriented to person, place, and time. Coordination normal.   Skin: Skin is warm. No rash noted. Not diaphoretic. No erythema. Psychiatric:  Normal mood and affect. Behavior is normal.   Current Outpatient Medications on File Prior to Visit   Medication Sig Dispense Refill    metoprolol succinate (TOPROL XL) 25 mg XL tablet Take 25 mg by mouth daily. 1/2 tablet daily      atorvastatin (LIPITOR) 40 mg tablet Take 1 Tab by mouth nightly. 30 Tab 3    aspirin 81 mg chewable tablet Take 1 Tab by mouth daily. 90 Tab 3    ticagrelor (BRILINTA) 90 mg tablet Take 1 Tab by mouth every twelve (12) hours every twelve (12) hours. 60 Tab 11    amLODIPine-benazepril (LOTREL) 5-10 mg per capsule Take 1 Cap by mouth daily.  B.infantis-B.ani-B.long-B.bifi (PROBIOTIC 4X) 10-15 mg TbEC Take 1 Tab by mouth daily.  cholecalciferol (VITAMIN D3) 1,000 unit tablet Take 1,000 Units by mouth daily. No current facility-administered medications on file prior to visit. Lab Results   Component Value Date/Time    Cholesterol, total 114 05/17/2019 09:14 AM    HDL Cholesterol 44 05/17/2019 09:14 AM    LDL, calculated 56 05/17/2019 09:14 AM    VLDL, calculated 14 05/17/2019 09:14 AM    Triglyceride 69 05/17/2019 09:14 AM    CHOL/HDL Ratio 3.1 04/23/2019 04:58 PM     Lab Results   Component Value Date/Time    ALT (SGPT) 18 05/17/2019 09:14 AM    AST (SGOT) 17 05/17/2019 09:14 AM    Alk.  phosphatase 91 05/17/2019 09:14 AM    Bilirubin, direct 0.24 05/17/2019 09:14 AM    Bilirubin, total 0.8 05/17/2019 09:14 AM       ASSESSMENT and PLAN  CAD: s/p nstemi on 4/19 with stenosis and  thrombus of rca, s/p successful pci with luz of rca but residual om stenosis ( 2 branches and as per Dr. Cinthya Strange"  OM2 disease would be difficult to treat with PCI due to ostial OM with immediate bifurcation with disease involving both branches of OM 2. \"      ECG with SB and no significant ST-T wave abnormalities. Discussed results of nuclear stress test with mild ischemia essentially in the same territory of om  Discussed options  He remains asymptomatic at this time.     continue asa/brylinta toprol and statin    I will ask interventionallist to again reviewed the cardiac catheterization to determine if it is reasonable to proceed with PCI in this asymptomatic patient.     discussed exercise otc meds nutrition caffeine  and cardiac wellness       HTN:well controlled     HLD: ldl goal <70 well controlled!   Repeat in 1 month.     See him back in 6 months otherwise

## 2019-09-30 ENCOUNTER — HOSPITAL ENCOUNTER (OUTPATIENT)
Dept: CARDIAC REHAB | Age: 44
Discharge: HOME | End: 2019-09-30
Payer: COMMERCIAL

## 2019-09-30 VITALS — BODY MASS INDEX: 24.71 KG/M2 | WEIGHT: 182.2 LBS

## 2019-09-30 PROCEDURE — 93798 PHYS/QHP OP CAR RHAB W/ECG: CPT

## 2019-10-17 ENCOUNTER — HOSPITAL ENCOUNTER (OUTPATIENT)
Dept: CARDIAC REHAB | Age: 44
Discharge: HOME | End: 2019-10-17
Payer: COMMERCIAL

## 2019-10-17 VITALS — BODY MASS INDEX: 25.01 KG/M2 | WEIGHT: 184.4 LBS

## 2019-10-17 PROCEDURE — 93798 PHYS/QHP OP CAR RHAB W/ECG: CPT

## 2019-10-21 ENCOUNTER — HOSPITAL ENCOUNTER (OUTPATIENT)
Dept: CARDIAC REHAB | Age: 44
Discharge: HOME | End: 2019-10-21
Payer: COMMERCIAL

## 2019-10-21 VITALS — WEIGHT: 185.2 LBS | BODY MASS INDEX: 25.12 KG/M2

## 2019-10-21 PROCEDURE — 93798 PHYS/QHP OP CAR RHAB W/ECG: CPT

## 2019-11-18 ENCOUNTER — HOSPITAL ENCOUNTER (OUTPATIENT)
Dept: CARDIAC REHAB | Age: 44
Discharge: HOME | End: 2019-11-18
Payer: COMMERCIAL

## 2019-11-18 VITALS — WEIGHT: 183 LBS | BODY MASS INDEX: 24.82 KG/M2

## 2019-11-18 PROCEDURE — 93798 PHYS/QHP OP CAR RHAB W/ECG: CPT

## 2019-12-11 LAB
ALBUMIN SERPL-MCNC: 4.5 G/DL (ref 3.5–5.5)
ALP SERPL-CCNC: 76 IU/L (ref 39–117)
ALT SERPL-CCNC: 23 IU/L (ref 0–44)
AST SERPL-CCNC: 22 IU/L (ref 0–40)
BILIRUB DIRECT SERPL-MCNC: 0.26 MG/DL (ref 0–0.4)
BILIRUB SERPL-MCNC: 0.8 MG/DL (ref 0–1.2)
CHOLEST SERPL-MCNC: 137 MG/DL (ref 100–199)
HDLC SERPL-MCNC: 53 MG/DL
INTERPRETATION, 910389: NORMAL
LDLC SERPL CALC-MCNC: 65 MG/DL (ref 0–99)
PROT SERPL-MCNC: 6.7 G/DL (ref 6–8.5)
TRIGL SERPL-MCNC: 95 MG/DL (ref 0–149)
VLDLC SERPL CALC-MCNC: 19 MG/DL (ref 5–40)

## 2019-12-13 ENCOUNTER — TELEPHONE (OUTPATIENT)
Dept: CARDIOLOGY CLINIC | Age: 44
End: 2019-12-13

## 2019-12-16 NOTE — TELEPHONE ENCOUNTER
Per Dr. Loan Villatoro, regarding recent lipid/liver labs:      Good panel continue rx     Identifiers x 2. Informed of lab results. Verbalized understanding. Confirmed follow up appt.      Future Appointments   Date Time Provider Marcy Aline   3/26/2020  9:00 AM MD Adam Islas

## 2019-12-17 RX ORDER — ATORVASTATIN CALCIUM 40 MG/1
40 TABLET, FILM COATED ORAL
Qty: 90 TAB | Refills: 1 | Status: SHIPPED | OUTPATIENT
Start: 2019-12-17 | End: 2020-03-19 | Stop reason: SDUPTHER

## 2019-12-17 NOTE — TELEPHONE ENCOUNTER
Requested Prescriptions     Signed Prescriptions Disp Refills    atorvastatin (LIPITOR) 40 mg tablet 90 Tab 1     Sig: Take 1 Tab by mouth nightly. Authorizing Provider: Wang Mcmullen     Ordering User: Celester Ireland     Verbal order per Dr. Ivy Baca.     Future Appointments   Date Time Provider Marcy Mireles   3/26/2020  9:00 AM MD Adam Lino [facility]

## 2019-12-18 ENCOUNTER — TELEPHONE (OUTPATIENT)
Dept: CARDIAC REHAB | Age: 44
End: 2019-12-18

## 2019-12-18 NOTE — TELEPHONE ENCOUNTER
Cardiac Rehab; Call placed to [address] [place] [address] to check on his absence from the program and possible return. LM to return a call. Faisal Long RN

## 2019-12-24 DIAGNOSIS — I25.119 CORONARY ARTERY DISEASE INVOLVING NATIVE CORONARY ARTERY OF NATIVE HEART WITH ANGINA PECTORIS (HCC): Primary | ICD-10-CM

## 2019-12-24 NOTE — TELEPHONE ENCOUNTER
Request for Brilinta 90 BID. Last office visit 5-30-19, next office visit 3-26-20. Refills per verbal order from Dr. Raiza Rogers.

## 2019-12-24 NOTE — TELEPHONE ENCOUNTER
Patient is requesting a 90 day supply of Brilinta as his insurance will on cover 90 days. Pharmacy confirmed.      Phone: [phone]

## 2020-01-03 ENCOUNTER — HOSPITAL ENCOUNTER (OUTPATIENT)
Dept: CARDIAC REHAB | Age: 45
Discharge: HOME | End: 2020-01-03
Payer: COMMERCIAL

## 2020-01-03 VITALS — WEIGHT: 188.2 LBS | BODY MASS INDEX: 25.52 KG/M2

## 2020-01-03 PROCEDURE — 93798 PHYS/QHP OP CAR RHAB W/ECG: CPT

## 2020-01-10 ENCOUNTER — HOSPITAL ENCOUNTER (OUTPATIENT)
Dept: CARDIAC REHAB | Age: 45
Discharge: HOME | End: 2020-01-10
Payer: COMMERCIAL

## 2020-01-10 VITALS — BODY MASS INDEX: 25.36 KG/M2 | WEIGHT: 187 LBS

## 2020-01-10 PROCEDURE — 93798 PHYS/QHP OP CAR RHAB W/ECG: CPT

## 2020-01-14 ENCOUNTER — TELEPHONE (OUTPATIENT)
Dept: CARDIAC REHAB | Age: 45
End: 2020-01-14

## 2020-01-14 NOTE — TELEPHONE ENCOUNTER
1/14/2020 Cardiac Wellness: Mr. Jackson Pretty called to cancel Friday's appointment d/t being out of town for work. Will return for next appointment.  Aura Eid

## 2020-01-17 ENCOUNTER — APPOINTMENT (OUTPATIENT)
Dept: CARDIAC REHAB | Age: 45
End: 2020-01-17
Payer: COMMERCIAL

## 2020-01-24 ENCOUNTER — HOSPITAL ENCOUNTER (OUTPATIENT)
Dept: CARDIAC REHAB | Age: 45
Discharge: HOME | End: 2020-01-24
Payer: COMMERCIAL

## 2020-01-24 VITALS — BODY MASS INDEX: 25.36 KG/M2 | WEIGHT: 187 LBS

## 2020-01-24 PROCEDURE — 93798 PHYS/QHP OP CAR RHAB W/ECG: CPT

## 2020-01-31 ENCOUNTER — HOSPITAL ENCOUNTER (OUTPATIENT)
Dept: CARDIAC REHAB | Age: 45
Discharge: HOME | End: 2020-01-31
Payer: COMMERCIAL

## 2020-01-31 VITALS — BODY MASS INDEX: 25.36 KG/M2 | WEIGHT: 187 LBS

## 2020-01-31 PROCEDURE — 93798 PHYS/QHP OP CAR RHAB W/ECG: CPT

## 2020-04-14 NOTE — CARDIO/PULMONARY
Navjot Muhammad Kary - NSTEMI/STENT  Completed phase II cardiac rehab and attended 36 sessions from 5/7/19 - 1/31/20. Kathy Jesus is interested in maintaining optimal health and will work with Dr. Stephania Bergman MD.  Kathy Lee has improved his endurance and stamina through regular exercise, lost  .75 inches from his waist. Their short term goal was 175-180lb, and their long term goal was <180lb. Blood pressure is 124/66 and is WNL. He has also improved his nutrition and Cardiac Knowledge scores and these were reviewed with patient. Navjot Tabor plans to continue exercising at the gym near his home, and has set revised goals that include cardio equipment, light weights and walking 3 to 5 times a week. He also plans to incorporate running outside into his routine.     Parveen Quan RN  4/14/2020

## 2020-04-30 NOTE — TELEPHONE ENCOUNTER
Multiple falls aty home  Difficult to assess as going through etoh detox  So far findings c/w alcoholic polyneutropathy  Possible cerebellar degeneration  Neuro consult appreciated   Pt called to say he is caught up at work and will not be in today.

## 2020-05-12 ENCOUNTER — VIRTUAL VISIT (OUTPATIENT)
Dept: CARDIOLOGY CLINIC | Age: 45
End: 2020-05-12

## 2020-05-12 ENCOUNTER — TELEPHONE (OUTPATIENT)
Dept: CARDIOLOGY CLINIC | Age: 45
End: 2020-05-12

## 2020-05-12 VITALS — DIASTOLIC BLOOD PRESSURE: 90 MMHG | HEART RATE: 43 BPM | SYSTOLIC BLOOD PRESSURE: 140 MMHG

## 2020-05-12 DIAGNOSIS — I10 ESSENTIAL HYPERTENSION: ICD-10-CM

## 2020-05-12 DIAGNOSIS — I25.119 CORONARY ARTERY DISEASE INVOLVING NATIVE CORONARY ARTERY OF NATIVE HEART WITH ANGINA PECTORIS (HCC): Primary | ICD-10-CM

## 2020-05-12 DIAGNOSIS — R00.1 BRADYCARDIA: ICD-10-CM

## 2020-05-12 DIAGNOSIS — E78.49 OTHER HYPERLIPIDEMIA: ICD-10-CM

## 2020-05-12 NOTE — PATIENT INSTRUCTIONS
Stop taking brilinta. Record blood pressure/heart rate twice a week. Remember to sit for at least 3 minutes. Have both feet flat on floor and arm at heart level. Call office with readings and/or concerns. Have fasting labs obtained in June/2020. Our office will call you with results. Follow up with Dr. Mana Cooper in 6 months with stress echo. You will be scheduled for a stress echocardiogram after your appointment today. Please wear comfortable clothing (shorts or pants with a shirt or blouse) and walking/athletic shoes.  
 
Do not eat or drink anything, except water, for at least 2 hours prior to your test. 
 
Do not take toprol for 24 hours prior to  Test.

## 2020-05-12 NOTE — PROGRESS NOTES
[facility] Telemedicine Encounter                                                         Pursuant to the emergency declaration under the [facility], [facility] waiver authority and the [employer] Act, this Virtual  Visit was conducted, with patient's consent, to reduce the patient's risk of exposure to COVID-19 and provide continuity of care for an established patient. Services were provided through a video synchronous discussion virtually to substitute for in-person clinic visit. Subjective      He is doing very well exercising with no issues   no cp or sob or palpitations or dizziness reported    HPI  38 y.o. male. with PMH of HTN, presented 4/22/19 with chief c/o chest pain. Noted to have NSTEMI with troponin peak of 1.65. Underwent success PCI/GALINA of proximal RCA.   Left heart catheterization with PCI on 4/23/19  Findings:   L Main: large, no significant disease  LAD: large caliber, mild disease  LCx: large, supplies L-PL branches, OM 2 with ostial disease of 70% with immediate bifurcation into two branches, superior branch with 70% disease and smaller inferior 80% diseae  RCA: large caliber vessel, proximal  with hazy filling defect, distal vessel fills via collaterals   LVEDP:  10-12 mmhg   PCI:   Two vessel CAD with disease involving branches of OM and sub-acute occlusion of proximal RCA  2.  Normal LVEDP  3.  Successful PCI of proximal RCA          04/22/19   ECHO ADULT COMPLETE 04/24/2019 4/24/2019     Narrative · Estimated left ventricular ejection fraction is 56 - 60%.           Signed by: Anthony Bright MD            Past Medical History:   Diagnosis Date    CAD (coronary artery disease)     HTN (hypertension)          Past Surgical History:   Procedure Laterality Date    CARDIAC SURG PROCEDURE UNLIST  04/23/2019    1 stent    HX HEENT      adnoids removed as a child         Social History Tobacco Use    Smoking status: Never Smoker    Smokeless tobacco: Never Used   Substance Use Topics    Alcohol use: Yes     Alcohol/week: 14.0 standard drinks     Types: 7 Cans of beer, 7 Glasses of wine per week    Drug use: Not on file       04/22/19   ECHO ADULT COMPLETE 04/24/2019 4/24/2019    Narrative · Estimated left ventricular ejection fraction is 56 - 60%. Signed by: Harshad Floyd MD       05/23/19   NUCLEAR CARDIAC STRESS TEST 05/24/2019 5/24/2019    Narrative · Baseline ECG: Normal sinus rhythm, non-specific ST-T wave abnormalities. · Low risk Duke treadmill score. · Gated SPECT: Left ventricular function post-stress was normal.   Calculated ejection fraction is 59%. · Myocardial perfusion imaging defect 1: There is a defect that is small   to moderate in size with a mild reduction in uptake present in the mid to   basal inferolateral location(s) that is reversible. The defect appears to   probably be ischemia. · Myocardial perfusion imaging defect 2: There is a defect that is small   to moderate in size with a moderate reduction in uptake affecting the   apical anterior and inferior location(s) that is non-reversible. The   defect appears to probably be artifact.   · Left ventricular perfusion is probably abnormal.     Mild ischemia basal to mid infero lateral wall  Apical artifact  EF 59%       Signed by: Harshad Floyd MD       04/22/19   CARDIAC PROCEDURE 04/25/2019 4/25/2019    Narrative Findings:   L Main: large, no significant disease  LAD: large caliber, mild disease  LCx: large, supplies L-PL branches, OM 2 with ostial disease of 70% with   immediate bifurcation into two branches, superior branch with 70% disease   and smaller inferior 80% diseae  RCA: large caliber vessel, proximal  with hazy filling defect, distal   vessel fills via collaterals     LVEDP:  10-12 mmhg     PCI:   AL 0.75 guide  Heparin for AC  runthrough wire passed easily into distal vessel     2.5x12 compliant balloon  Post-inflation evidence of thrombus in distal aspect of RV marginal     3.5x28mm Xience Yuridia GALINA, deployed at [facility]. Distally post-dilated with 3.5NC balloon to high pressure  Mid and proximal with 4. 0NC balloon at high pressure  Very proximal aspect of stent 4.5NC balloon at high pressure     IVUS guided     Findings:  1. Two vessel CAD with disease involving branches of OM and sub-acute   occlusion of proximal RCA  2. Normal LVEDP  3. Successful PCI of proximal RCA            Signed by: Severo Carrel, DO           Visit Vitals  /90   Pulse (!) 43         Wt Readings from Last 3 Encounters:   01/31/20 187 lb (84.8 kg)   01/24/20 187 lb (84.8 kg)   01/10/20 187 lb (84.8 kg)           Review of Symptoms  11 systems reviewed, negative other than as stated in the HPI    Physical Exam:    Due to this being a TeleHealth evaluation, many elements of the physical examination are unable to be assessed. General: Well developed, in no acute distress, cooperative and alert  HEENT: Pupils equal/round. No marked JVD visible on video. Respiratory: No audible wheezing, no signs of respiratory distress, lips non cyanotic  Extremities:  No edema  Neuro: A&Ox3, speech clear, no facial droop, answering questions appropriately  Skin: Skin color is normal. No rashes or lesions. Non diaphoretic on visible skin during exam          Current Outpatient Medications on File Prior to Visit   Medication Sig Dispense Refill    metoprolol succinate (TOPROL-XL) 25 mg XL tablet Take 1 Tab by mouth daily. 45 Tab 1    ticagrelor (BRILINTA) 90 mg tablet Take 1 Tab by mouth every twelve (12) hours every twelve (12) hours. 180 Tab 0    atorvastatin (LIPITOR) 40 mg tablet Take 1 Tab by mouth nightly. 90 Tab 1    amLODIPine-benazepril (LOTREL) 5-10 mg per capsule Take 1 Cap by mouth daily. 90 Cap 1    aspirin 81 mg chewable tablet Take 1 Tab by mouth daily.  90 Tab 3    B.infantis-B.ani-B.long-B.bifi (PROBIOTIC 4X) 10-15 mg TbEC Take 1 Tab by mouth daily.  cholecalciferol (VITAMIN D3) 1,000 unit tablet Take 1,000 Units by mouth daily. No current facility-administered medications on file prior to visit. Lab Results   Component Value Date/Time    Cholesterol, total 137 12/10/2019 08:47 AM    HDL Cholesterol 53 12/10/2019 08:47 AM    LDL, calculated 65 12/10/2019 08:47 AM    VLDL, calculated 19 12/10/2019 08:47 AM    Triglyceride 95 12/10/2019 08:47 AM    CHOL/HDL Ratio 3.1 04/23/2019 04:58 PM         Lab Results   Component Value Date/Time    Sodium 140 04/23/2019 04:14 AM    Potassium 3.9 04/23/2019 04:14 AM    Chloride 108 04/23/2019 04:14 AM    CO2 27 04/23/2019 04:14 AM    Anion gap 5 04/23/2019 04:14 AM    Glucose 98 04/23/2019 04:14 AM    BUN 13 04/23/2019 04:14 AM    Creatinine 0.91 04/23/2019 04:14 AM    BUN/Creatinine ratio 14 04/23/2019 04:14 AM    GFR est AA >60 04/23/2019 04:14 AM    GFR est non-AA >60 04/23/2019 04:14 AM    Calcium 8.8 04/23/2019 04:14 AM         Lab Results   Component Value Date/Time    ALT (SGPT) 23 12/10/2019 08:47 AM    AST (SGOT) 22 12/10/2019 08:47 AM    Alk. phosphatase 76 12/10/2019 08:47 AM    Bilirubin, direct 0.26 12/10/2019 08:47 AM    Bilirubin, total 0.8 12/10/2019 08:47 AM         Lab Results   Component Value Date/Time    WBC 8.2 04/24/2019 03:25 AM    HGB 13.2 04/24/2019 03:25 AM    HCT 40.4 04/24/2019 03:25 AM    PLATELET 388 84/06/7591 03:25 AM    MCV 94.2 04/24/2019 03:25 AM             Assessment  /Plan  :  CAD: s/p nstemi on 4/19 with stenosis and  thrombus of rca, s/p successful pci with luz of rca but residual om stenosis ( 2 branches and as per Dr. Arpita Espinoza"  OM2 disease would be difficult to treat with PCI due to ostial OM with immediate bifurcation with disease involving both branches of OM 2. \"      last ECG with SB and no significant ST-T wave abnormalities.   Last nuclear stress test with mild ischemia essentially in the same territory of om  Discussed options  He remains asymptomatic at this time. Discussed also with interventionalist continue medical rx for now     continue asa toprol and statin  pci >1 year stop brilinta        discussed exercise otc meds nutrition caffeine    He remains nonetheless very active  Stress echo at the next OV     HTN: borderline elevated today but usually better he tells me   he will keep a log of his bp and let me know     HLD: ldl goal <70 well controlled as of 12/19 repeat NMR and LFT in 6/20.     See him back in 6 months otherwise                   We discussed the expected course, resolution and complications of the diagnosis(es) in detail. Medication risks, benefits, costs, interactions, and alternatives were discussed as indicated. I advised him   to contact the office if her condition worsens, changes or fails to improve as anticipated. he expressed understanding with the diagnosis(es) and plan        Harshad Cason MD      Greater than 20 minutes was spent in direct video patient care, planning and chart review. This visit was conducted using [employer] telemedicine services.

## 2020-05-13 ENCOUNTER — TELEPHONE (OUTPATIENT)
Dept: CARDIOLOGY CLINIC | Age: 45
End: 2020-05-13

## 2020-06-12 DIAGNOSIS — I25.119 CORONARY ARTERY DISEASE INVOLVING NATIVE CORONARY ARTERY OF NATIVE HEART WITH ANGINA PECTORIS (HCC): ICD-10-CM

## 2020-06-12 RX ORDER — TICAGRELOR 90 MG/1
TABLET ORAL
Qty: 180 TAB | Refills: 1 | Status: SHIPPED | OUTPATIENT
Start: 2020-06-12 | End: 2020-11-20

## 2020-09-04 RX ORDER — METOPROLOL SUCCINATE 25 MG/1
TABLET, EXTENDED RELEASE ORAL
Qty: 45 TAB | Refills: 0 | Status: SHIPPED | OUTPATIENT
Start: 2020-09-04 | End: 2020-12-04

## 2020-09-04 RX ORDER — ATORVASTATIN CALCIUM 40 MG/1
TABLET, FILM COATED ORAL
Qty: 90 TAB | Refills: 0 | Status: SHIPPED | OUTPATIENT
Start: 2020-09-04 | End: 2020-12-04

## 2020-09-07 LAB
ALBUMIN SERPL-MCNC: 4.4 G/DL (ref 4–5)
ALP SERPL-CCNC: 77 IU/L (ref 39–117)
ALT SERPL-CCNC: 36 IU/L (ref 0–44)
AST SERPL-CCNC: 29 IU/L (ref 0–40)
BILIRUB DIRECT SERPL-MCNC: 0.26 MG/DL (ref 0–0.4)
BILIRUB SERPL-MCNC: 1 MG/DL (ref 0–1.2)
CHOLEST SERPL-MCNC: 131 MG/DL (ref 100–199)
HDL SERPL-SCNC: 29.1 UMOL/L
HDLC SERPL-MCNC: 46 MG/DL
INTERPRETATION, 910389: NORMAL
LDL SERPL QN: 20.7 NM
LDL SERPL-SCNC: 848 NMOL/L
LDL SMALL SERPL-SCNC: 371 NMOL/L
LDLC SERPL CALC-MCNC: 70 MG/DL (ref 0–99)
LP-IR SCORE SERPL: 60
PROT SERPL-MCNC: 7.1 G/DL (ref 6–8.5)
TRIGL SERPL-MCNC: 72 MG/DL (ref 0–149)

## 2020-11-20 ENCOUNTER — APPOINTMENT (OUTPATIENT)
Dept: CARDIOLOGY CLINIC | Age: 45
End: 2020-11-20

## 2020-11-20 ENCOUNTER — OFFICE VISIT (OUTPATIENT)
Dept: CARDIOLOGY CLINIC | Age: 45
End: 2020-11-20
Payer: COMMERCIAL

## 2020-11-20 ENCOUNTER — ANCILLARY PROCEDURE (OUTPATIENT)
Dept: CARDIOLOGY CLINIC | Age: 45
End: 2020-11-20
Payer: COMMERCIAL

## 2020-11-20 VITALS
BODY MASS INDEX: 26.28 KG/M2 | DIASTOLIC BLOOD PRESSURE: 74 MMHG | WEIGHT: 194 LBS | SYSTOLIC BLOOD PRESSURE: 124 MMHG | HEIGHT: 72 IN

## 2020-11-20 VITALS
RESPIRATION RATE: 18 BRPM | WEIGHT: 195 LBS | SYSTOLIC BLOOD PRESSURE: 124 MMHG | HEIGHT: 72 IN | BODY MASS INDEX: 26.41 KG/M2 | DIASTOLIC BLOOD PRESSURE: 70 MMHG | HEART RATE: 62 BPM | OXYGEN SATURATION: 96 %

## 2020-11-20 DIAGNOSIS — I25.119 CORONARY ARTERY DISEASE INVOLVING NATIVE CORONARY ARTERY OF NATIVE HEART WITH ANGINA PECTORIS (HCC): Primary | ICD-10-CM

## 2020-11-20 DIAGNOSIS — I25.10 CORONARY ARTERY DISEASE INVOLVING NATIVE CORONARY ARTERY OF NATIVE HEART WITHOUT ANGINA PECTORIS: ICD-10-CM

## 2020-11-20 DIAGNOSIS — I10 ESSENTIAL HYPERTENSION: ICD-10-CM

## 2020-11-20 DIAGNOSIS — E78.49 OTHER HYPERLIPIDEMIA: ICD-10-CM

## 2020-11-20 LAB
STRESS ANGINA INDEX: 0
STRESS BASELINE DIAS BP: 74 MMHG
STRESS BASELINE HR: 62 BPM
STRESS BASELINE SYS BP: 124 MMHG
STRESS ESTIMATED WORKLOAD: 17.2 METS
STRESS EXERCISE DUR MIN: NORMAL MIN:SEC
STRESS O2 SAT PEAK: 92 %
STRESS PEAK DIAS BP: 94 MMHG
STRESS PEAK SYS BP: 220 MMHG
STRESS PERCENT HR ACHIEVED: 87 %
STRESS POST PEAK HR: 153 BPM
STRESS RATE PRESSURE PRODUCT: NORMAL BPM*MMHG
STRESS SR DUKE TREADMILL SCORE: 13
STRESS ST DEPRESSION: 0 MM
STRESS ST ELEVATION: 0 MM
STRESS TARGET HR: 176 BPM

## 2020-11-20 PROCEDURE — 93351 STRESS TTE COMPLETE: CPT | Performed by: SPECIALIST

## 2020-11-20 PROCEDURE — 99214 OFFICE O/P EST MOD 30 MIN: CPT | Performed by: SPECIALIST

## 2020-11-20 NOTE — PROGRESS NOTES
Visit Vitals  /70 (BP 1 Location: Left arm, BP Patient Position: Sitting)   Pulse 62   Resp 18   Ht 6' (1.829 m)   Wt 195 lb (88.5 kg)   SpO2 96%   BMI 26.45 kg/m²

## 2020-11-20 NOTE — PROGRESS NOTES
[facility]                                                            OFFICE NOTE        Krysten Bailey M.D.,OMA Nancy Mukherjee   1975  [id number]    Date/Time:  11/20/20209:06 AM          SUBJECTIVE:  Doing very well    no cp or sob or palpitations    He remains very active with no issues       Assessment/Plan  CAD: s/p nstemi on 4/19 with stenosis and  thrombus of rca, s/p successful pci with luz of rca     residual om stenosis ( 2 branches and as per Dr. Adan Rodriguez"  OM2 disease would be difficult to treat with PCI due to ostial OM with immediate bifurcation with disease involving both branches of OM 2. \"      last ECG with SB and no significant ST-T wave abnormalities. Last nuclear stress test with mild ischemia essentially in the same territory of om  On the other hand stress echo today with no visible ischemia and normal ECG and EF    He remains asymptomatic at this time. Discussed also with interventionalist continue medical rx for now     continue asa toprol and statin now off brilinta        discussed exercise otc meds nutrition caffeine    He remains nonetheless very active       HTN: elevated today during stress test but usually better he tells me   he will keep a log of his bp and let me know     HLD: ldl goal <70 well controlled as of 12/19  Discussed nmr of 9/20 , no changes in meds for now  Follow with pcp for elevated  LP-IR score to monitor for potential elevationi in glucose    See him back in 9 months otherwise                    HPI   41 y. o. male. with PMH of HTN, presented 4/22/19 with chief c/o chest pain. Noted to have NSTEMI with troponin peak of 1.65.  Underwent success PCI/LUZ of proximal RCA.     Left heart catheterization with PCI on 4/23/19  Findings:   L Main: large, no significant disease  LAD: large caliber, mild disease  LCx: large, supplies L-PL branches, OM 2 with ostial disease of 70% with immediate bifurcation into two branches, superior branch with 70% disease and smaller inferior 80% diseae  RCA: large caliber vessel, proximal  with hazy filling defect, distal vessel fills via collaterals   LVEDP:  10-12 mmhg   PCI:   Two vessel CAD with disease involving branches of OM and sub-acute occlusion of proximal RCA  2.  Normal LVEDP  3.  Successful PCI of proximal RCA              CARDIAC STUDIES        04/22/19   ECHO ADULT COMPLETE 04/24/2019 4/24/2019    Narrative · Estimated left ventricular ejection fraction is 56 - 60%. Signed by: Mike Hall MD             05/23/19   NUCLEAR CARDIAC STRESS TEST 05/24/2019 5/24/2019    Narrative · Baseline ECG: Normal sinus rhythm, non-specific ST-T wave abnormalities. · Low risk Duke treadmill score. · Gated SPECT: Left ventricular function post-stress was normal.   Calculated ejection fraction is 59%. · Myocardial perfusion imaging defect 1: There is a defect that is small   to moderate in size with a mild reduction in uptake present in the mid to   basal inferolateral location(s) that is reversible. The defect appears to   probably be ischemia. · Myocardial perfusion imaging defect 2: There is a defect that is small   to moderate in size with a moderate reduction in uptake affecting the   apical anterior and inferior location(s) that is non-reversible. The   defect appears to probably be artifact.   · Left ventricular perfusion is probably abnormal.     Mild ischemia basal to mid infero lateral wall  Apical artifact  EF 59%       Signed by: Mike Hall MD             04/22/19   CARDIAC PROCEDURE 04/25/2019 4/25/2019    Narrative Findings:   L Main: large, no significant disease  LAD: large caliber, mild disease  LCx: large, supplies L-PL branches, OM 2 with ostial disease of 70% with   immediate bifurcation into two branches, superior branch with 70% disease   and smaller inferior 80% diseae  RCA: large caliber vessel, proximal  with hazy filling defect, distal   vessel fills via collaterals     LVEDP:  10-12 mmhg     PCI:   AL 0.75 guide  Heparin for AC  runthrough wire passed easily into distal vessel     2.5x12 compliant balloon  Post-inflation evidence of thrombus in distal aspect of RV marginal     3.5x28mm Xience Yuridia GALINA, deployed at [facility]. Distally post-dilated with 3.5NC balloon to high pressure  Mid and proximal with 4. 0NC balloon at high pressure  Very proximal aspect of stent 4.5NC balloon at high pressure     IVUS guided     Findings:  1. Two vessel CAD with disease involving branches of OM and sub-acute   occlusion of proximal RCA  2. Normal LVEDP  3. Successful PCI of proximal RCA            Signed by: Teresa Cisneros DO           EKG Results     None              IMAGING      MRI Results (most recent):  No results found for this or any previous visit. CT Results (most recent):  No results found for this or any previous visit. XR Results (most recent):  Results from Hospital Encounter encounter on 04/22/19   XR CHEST PA LAT    Narrative EXAM:  XR CHEST PA LAT    INDICATION: Exertional chest discomfort    COMPARISON: None    TECHNIQUE: PA and lateral chest views    FINDINGS: The cardiomediastinal and hilar contours are within normal limits. The  pulmonary vasculature is within normal limits. The lungs and pleural spaces are clear. There is no pneumothorax. The visualized  bones and upper abdomen are age-appropriate. Impression IMPRESSION:    No acute process. Past Medical History:   Diagnosis Date    CAD (coronary artery disease)     HTN (hypertension)      Past Surgical History:   Procedure Laterality Date    CARDIAC SURG PROCEDURE UNLIST  04/23/2019    1 stent    HX HEENT      adnoids removed as a child     Social History     Tobacco Use    Smoking status: Never Smoker    Smokeless tobacco: Never Used   Substance Use Topics    Alcohol use:  Yes Alcohol/week: 14.0 standard drinks     Types: 7 Cans of beer, 7 Glasses of wine per week    Drug use: Not on file     Family History   Problem Relation Age of Onset    Heart Disease Father         heart attack    Hypertension Father     Elevated Lipids Father     Elevated Lipids Brother      No Known Allergies      There were no vitals taken for this visit. There were no vitals filed for this visit. Review of Systems:   Pertinent items are noted in the History of Present Illness. Visit Vitals  /70 (BP 1 Location: Left arm, BP Patient Position: Sitting)   Pulse 62   Resp 18   Ht 6' (1.829 m)   Wt 195 lb (88.5 kg)   SpO2 96%   BMI 26.45 kg/m²     General Appearance:  Well developed, well nourished,alert and oriented x 3, and individual in no acute distress. Ears/Nose/Mouth/Throat:   Hearing grossly normal.         Neck: Supple. Chest:   Lungs clear to auscultation bilaterally. Cardiovascular:  Regular rate and rhythm, S1, S2 normal, no murmur. Abdomen:   Soft, non-tender, bowel sounds are active. Extremities: No edema bilaterally. Skin: Warm and dry. Current Outpatient Medications on File Prior to Visit   Medication Sig Dispense Refill    metoprolol succinate (TOPROL-XL) 25 mg XL tablet TAKE 1/2 TABLET BY MOUTH DAILY 45 Tab 0    atorvastatin (LIPITOR) 40 mg tablet TAKE 1 TABLET BY MOUTH EVERY NIGHT 90 Tab 0    Brilinta 90 mg tablet TAKE 1 TABLET BY MOUTH EVERY 12 HOURS 180 Tab 1    amLODIPine-benazepril (LOTREL) 5-10 mg per capsule Take 1 Cap by mouth daily. 90 Cap 1    aspirin 81 mg chewable tablet Take 1 Tab by mouth daily. 90 Tab 3    B.infantis-B.ani-B.long-B.bifi (PROBIOTIC 4X) 10-15 mg TbEC Take 1 Tab by mouth daily.  cholecalciferol (VITAMIN D3) 1,000 unit tablet Take 1,000 Units by mouth daily. No current facility-administered medications on file prior to visit.         John Hummel had no medications administered during this visit. Current Outpatient Medications   Medication Sig    metoprolol succinate (TOPROL-XL) 25 mg XL tablet TAKE 1/2 TABLET BY MOUTH DAILY    atorvastatin (LIPITOR) 40 mg tablet TAKE 1 TABLET BY MOUTH EVERY NIGHT    Brilinta 90 mg tablet TAKE 1 TABLET BY MOUTH EVERY 12 HOURS    amLODIPine-benazepril (LOTREL) 5-10 mg per capsule Take 1 Cap by mouth daily.  aspirin 81 mg chewable tablet Take 1 Tab by mouth daily.  B.infantis-B.ani-B.long-B.bifi (PROBIOTIC 4X) 10-15 mg TbEC Take 1 Tab by mouth daily.  cholecalciferol (VITAMIN D3) 1,000 unit tablet Take 1,000 Units by mouth daily. No current facility-administered medications for this visit. Lab Results   Component Value Date/Time    Cholesterol, total 137 12/10/2019 08:47 AM    Cholesterol, Total 131 09/04/2020 09:01 AM    HDL Cholesterol 53 12/10/2019 08:47 AM    LDL, calculated 65 12/10/2019 08:47 AM    VLDL, calculated 19 12/10/2019 08:47 AM    Triglyceride 95 12/10/2019 08:47 AM    CHOL/HDL Ratio 3.1 04/23/2019 04:58 PM       Lab Results   Component Value Date/Time    Sodium 140 04/23/2019 04:14 AM    Potassium 3.9 04/23/2019 04:14 AM    Chloride 108 04/23/2019 04:14 AM    CO2 27 04/23/2019 04:14 AM    Anion gap 5 04/23/2019 04:14 AM    Glucose 98 04/23/2019 04:14 AM    BUN 13 04/23/2019 04:14 AM    Creatinine 0.91 04/23/2019 04:14 AM    BUN/Creatinine ratio 14 04/23/2019 04:14 AM    GFR est AA >60 04/23/2019 04:14 AM    GFR est non-AA >60 04/23/2019 04:14 AM    Calcium 8.8 04/23/2019 04:14 AM       Lab Results   Component Value Date/Time    ALT (SGPT) 36 09/04/2020 09:01 AM    Alk.  phosphatase 77 09/04/2020 09:01 AM    Bilirubin, direct 0.26 09/04/2020 09:01 AM    Bilirubin, total 1.0 09/04/2020 09:01 AM       Lab Results   Component Value Date/Time    WBC 8.2 04/24/2019 03:25 AM    HGB 13.2 04/24/2019 03:25 AM    HCT 40.4 04/24/2019 03:25 AM    PLATELET 191 03/47/9083 03:25 AM    MCV 94.2 04/24/2019 03:25 AM       No results found for: TSH, TSH2, TSH3, TSHP, TSHEXT      Lab Results   Component Value Date/Time    Cholesterol, total 137 12/10/2019 08:47 AM    Cholesterol, total 114 05/17/2019 09:14 AM    Cholesterol, total 169 04/23/2019 04:58 PM    Cholesterol, Total 131 09/04/2020 09:01 AM    HDL Cholesterol 53 12/10/2019 08:47 AM    HDL Cholesterol 44 05/17/2019 09:14 AM    HDL Cholesterol 54 04/23/2019 04:58 PM    LDL, calculated 65 12/10/2019 08:47 AM    LDL, calculated 56 05/17/2019 09:14 AM    LDL, calculated 101.4 (H) 04/23/2019 04:58 PM    Triglyceride 95 12/10/2019 08:47 AM    Triglyceride 69 05/17/2019 09:14 AM    Triglyceride 68 04/23/2019 04:58 PM    CHOL/HDL Ratio 3.1 04/23/2019 04:58 PM                Please note that this dictation was completed with [employer] the [employer] voice recognition software. Quite often unanticipated grammatical, syntax, homophones, and other interpretative errors are inadvertently transcribed by the computer software. Please disregard these errors. Please excuse any errors that have escaped final proofreading.

## 2020-12-04 RX ORDER — ATORVASTATIN CALCIUM 40 MG/1
TABLET, FILM COATED ORAL
Qty: 90 TAB | Refills: 2 | Status: SHIPPED | OUTPATIENT
Start: 2020-12-04 | End: 2021-09-10

## 2020-12-04 RX ORDER — AMLODIPINE AND BENAZEPRIL HYDROCHLORIDE 5; 10 MG/1; MG/1
CAPSULE ORAL
Qty: 90 CAP | Refills: 2 | Status: SHIPPED | OUTPATIENT
Start: 2020-12-04 | End: 2021-09-10

## 2020-12-04 RX ORDER — METOPROLOL SUCCINATE 25 MG/1
TABLET, EXTENDED RELEASE ORAL
Qty: 45 TAB | Refills: 2 | Status: SHIPPED | OUTPATIENT
Start: 2020-12-04 | End: 2021-09-01

## 2020-12-04 NOTE — TELEPHONE ENCOUNTER
Cardiologist: Dr. Leandro Stuart    Last appt: 5/12/2020  Future Appointments   Date Time Provider Marcy Mireles   8/20/2021 10:20 AM MD DORIS Cowan BS AMB       Requested Prescriptions     Pending Prescriptions Disp Refills    metoprolol succinate (TOPROL-XL) 25 mg XL tablet [Pharmacy Med Name: METOPROLOL ER SUCCINATE 25MG TABS] 45 Tab 2     Sig: TAKE 1/2 TABLET BY MOUTH DAILY    amLODIPine-benazepril (LOTREL) 5-10 mg per capsule [Pharmacy Med Name: Evelyn Mow 90 Cap 2     Sig: TAKE 1 CAPSULE BY MOUTH DAILY    atorvastatin (LIPITOR) 40 mg tablet [Pharmacy Med Name: ATORVASTATIN 40MG TABLETS] 90 Tab 2     Sig: TAKE 1 TABLET BY MOUTH EVERY NIGHT         Refills VO per Dr. Imelda Barraza.

## 2021-09-01 ENCOUNTER — OFFICE VISIT (OUTPATIENT)
Dept: CARDIOLOGY CLINIC | Age: 46
End: 2021-09-01
Payer: COMMERCIAL

## 2021-09-01 VITALS
HEIGHT: 72 IN | BODY MASS INDEX: 26.55 KG/M2 | RESPIRATION RATE: 16 BRPM | HEART RATE: 66 BPM | SYSTOLIC BLOOD PRESSURE: 120 MMHG | DIASTOLIC BLOOD PRESSURE: 80 MMHG | OXYGEN SATURATION: 99 % | WEIGHT: 196 LBS

## 2021-09-01 DIAGNOSIS — I25.119 CORONARY ARTERY DISEASE INVOLVING NATIVE CORONARY ARTERY OF NATIVE HEART WITH ANGINA PECTORIS (HCC): Primary | ICD-10-CM

## 2021-09-01 PROCEDURE — 93000 ELECTROCARDIOGRAM COMPLETE: CPT | Performed by: SPECIALIST

## 2021-09-01 PROCEDURE — 99214 OFFICE O/P EST MOD 30 MIN: CPT | Performed by: SPECIALIST

## 2021-09-01 NOTE — PROGRESS NOTES
Visit Vitals  /80   Pulse 66   Resp 16   Ht 6' (1.829 m)   Wt 196 lb (88.9 kg)   SpO2 99%   BMI 26.58 kg/m²

## 2021-09-01 NOTE — PROGRESS NOTES
[facility]                                                            OFFICE NOTE        Randall Frost M.D.,OMA Micaela Brown   1975  [id number]    Date/Time:  9/1/20219:03 AM            SUBJECTIVE:  He is doing very well   no cp or sob or palpitations   He remains very active with no issues  He runs 3 miles at [employer] with no issues       Assessment/Plan    1. CAD: Status post NSTEMI in April 2019 related to stenosis and thrombus of the RCA. At that time status post successful PCI of the RCA with a drug-eluting stent. He does have residual obtuse marginal stenosis as per interventionist obtuse marginal 2 will be difficult to treat with PCI due to ostial stenosis with immediate bifurcation involving both branches of the obtuse marginal.    This is being treated medically thus far. Despite a known obtuse marginal branch stenosis he had a stress echocardiogram in November 2020 which failed to reveal any gross ischemia. His electrocardiogram today reveals sinus bradycardia rate of 49 with no significant ST-T changes. Continue aspirin and Lipitor. Now more than 2 years after his NSTEMI. Stop Toprol. He will keep an eye on his blood pressure in the meanwhile. 2.  Hypertension: Well controlled. 3.  Hyperlipidemia: On Lipitor and doing very well closely followed by his primary care physician. Encouraged him to stay active. Otherwise I will see him back in 9 months or sooner if any question or problems should arise prior to that. HPI   40 y. o. male. with PMH of HTN, presented 4/22/19 with chief c/o chest pain. Noted to have NSTEMI with troponin peak of 1.65.  Underwent success PCI/GALINA of proximal RCA.      Left heart catheterization with PCI on 4/23/19  Findings:   L Main: large, no significant disease  LAD: large caliber, mild disease  LCx: large, supplies L-PL branches, OM 2 with ostial disease of 70% with immediate bifurcation into two branches, superior branch with 70% disease and smaller inferior 80% diseae  RCA: large caliber vessel, proximal  with hazy filling defect, distal vessel fills via collaterals   LVEDP:  10-12 mmhg   PCI:   Two vessel CAD with disease involving branches of OM and sub-acute occlusion of proximal RCA  2.  Normal LVEDP  3.  Successful PCI of proximal RCA            CARDIAC STUDIES        11/20/20    ECHO STRESS 11/20/2020 11/23/2020    Interpretation Summary  · Baseline ECG: Normal sinus rhythm. · Low risk Duke treadmill score. · Negative stress test.  · Normal stress echocardiogram. Low risk study. Signed by: Seamus Escobedo MD on 11/20/2020 10:12 AM            05/23/19    NUCLEAR CARDIAC STRESS TEST 05/24/2019 5/24/2019    Interpretation Summary  · Baseline ECG: Normal sinus rhythm, non-specific ST-T wave abnormalities. · Low risk Duke treadmill score. · Gated SPECT: Left ventricular function post-stress was normal. Calculated ejection fraction is 59%. · Myocardial perfusion imaging defect 1: There is a defect that is small to moderate in size with a mild reduction in uptake present in the mid to basal inferolateral location(s) that is reversible. The defect appears to probably be ischemia. · Myocardial perfusion imaging defect 2: There is a defect that is small to moderate in size with a moderate reduction in uptake affecting the apical anterior and inferior location(s) that is non-reversible. The defect appears to probably be artifact.   · Left ventricular perfusion is probably abnormal.    Mild ischemia basal to mid infero lateral wall  Apical artifact  EF 59%    Signed by: Seamus Escobedo MD on 5/24/2019  7:52 AM            04/22/19    CARDIAC PROCEDURE 04/25/2019 4/25/2019    Conclusion  Findings:  L Main: large, no significant disease  LAD: large caliber, mild disease  LCx: large, supplies L-PL branches, OM 2 with ostial disease of 70% with immediate bifurcation into two branches, superior branch with 70% disease and smaller inferior 80% diseae  RCA: large caliber vessel, proximal  with hazy filling defect, distal vessel fills via collaterals    LVEDP:  10-12 mmhg    PCI:  AL 0.75 guide  Heparin for AC  runthrough wire passed easily into distal vessel    2.5x12 compliant balloon  Post-inflation evidence of thrombus in distal aspect of RV marginal    3.5x28mm Xience Yuridia GALINA, deployed at [facility]. Distally post-dilated with 3.5NC balloon to high pressure  Mid and proximal with 4. 0NC balloon at high pressure  Very proximal aspect of stent 4.5NC balloon at high pressure    IVUS guided    Findings:  1. 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 Normal LVEDP  3. Successful PCI of proximal RCA    Signed by: Ervin Martinez DO on 4/25/2019 10:06 AM          EKG Results     Procedure 720 Value Units Date/Time    AMB POC EKG ROUTINE W/ 12 LEADS, INTER & REP [[id number]] Resulted: 09/01/21 0849    Order Status: Completed Updated: 09/01/21 0852              IMAGING      MRI Results (most recent):  No results found for this or any previous visit. CT Results (most recent):  No results found for this or any previous visit. XR Results (most recent):  Results from Hospital Encounter encounter on 04/22/19    XR CHEST PA LAT    Narrative  EXAM:  XR CHEST PA LAT    INDICATION: Exertional chest discomfort    COMPARISON: None    TECHNIQUE: PA and lateral chest views    FINDINGS: The cardiomediastinal and hilar contours are within normal limits. The  pulmonary vasculature is within normal limits. The lungs and pleural spaces are clear. There is no pneumothorax. The visualized  bones and upper abdomen are age-appropriate. Impression  IMPRESSION:    No acute process.           Past Medical History:   Diagnosis Date    CAD (coronary artery disease)     HTN (hypertension) Past Surgical History:   Procedure Laterality Date    HX HEENT      adnoids removed as a child    AL CARDIAC SURG PROCEDURE UNLIST  04/23/2019    1 stent     Social History     Tobacco Use    Smoking status: Never Smoker    Smokeless tobacco: Never Used   Substance Use Topics    Alcohol use: Yes     Alcohol/week: 14.0 standard drinks     Types: 7 Cans of beer, 7 Glasses of wine per week    Drug use: Not on file     Family History   Problem Relation Age of Onset    Heart Disease Father         heart attack    Hypertension Father     Elevated Lipids Father     Elevated Lipids Brother      No Known Allergies      Visit Vitals  /80   Pulse 66   Resp 16   Ht 6' (1.829 m)   Wt 196 lb (88.9 kg)   SpO2 99%   BMI 26.58 kg/m²         Last 3 Recorded Weights in this Encounter    09/01/21 0843   Weight: 196 lb (88.9 kg)            Review of Systems:   Pertinent items are noted in the History of Present Illness. Visit Vitals  /80   Pulse 66   Resp 16   Ht 6' (1.829 m)   Wt 196 lb (88.9 kg)   SpO2 99%   BMI 26.58 kg/m²     General Appearance:  Well developed, well nourished,alert and oriented x 3, and individual in no acute distress. Ears/Nose/Mouth/Throat:   Hearing grossly normal.         Neck: Supple. Chest:   Lungs clear to auscultation bilaterally. Cardiovascular:  Regular rate and rhythm, S1, S2 normal, no murmur. Abdomen:   Soft, non-tender, bowel sounds are active. Extremities: No edema bilaterally. Skin: Warm and dry. Current Outpatient Medications on File Prior to Visit   Medication Sig Dispense Refill    metoprolol succinate (TOPROL-XL) 25 mg XL tablet TAKE 1/2 TABLET BY MOUTH DAILY 45 Tab 2    amLODIPine-benazepril (LOTREL) 5-10 mg per capsule TAKE 1 CAPSULE BY MOUTH DAILY 90 Cap 2    atorvastatin (LIPITOR) 40 mg tablet TAKE 1 TABLET BY MOUTH EVERY NIGHT 90 Tab 2    aspirin 81 mg chewable tablet Take 1 Tab by mouth daily.  90 Tab 3    B.infantis-B.ani-B.long-B.bifi (PROBIOTIC 4X) 10-15 mg TbEC Take 1 Tab by mouth daily.  cholecalciferol (VITAMIN D3) 1,000 unit tablet Take 1,000 Units by mouth daily. No current facility-administered medications on file prior to visit. Kelsey Aguilar had no medications administered during this visit. Current Outpatient Medications   Medication Sig    metoprolol succinate (TOPROL-XL) 25 mg XL tablet TAKE 1/2 TABLET BY MOUTH DAILY    amLODIPine-benazepril (LOTREL) 5-10 mg per capsule TAKE 1 CAPSULE BY MOUTH DAILY    atorvastatin (LIPITOR) 40 mg tablet TAKE 1 TABLET BY MOUTH EVERY NIGHT    aspirin 81 mg chewable tablet Take 1 Tab by mouth daily.  B.infantis-B.ani-B.long-B.bifi (PROBIOTIC 4X) 10-15 mg TbEC Take 1 Tab by mouth daily.  cholecalciferol (VITAMIN D3) 1,000 unit tablet Take 1,000 Units by mouth daily. No current facility-administered medications for this visit. Lab Results   Component Value Date/Time    Cholesterol, total 137 12/10/2019 08:47 AM    Cholesterol, Total 131 09/04/2020 09:01 AM    HDL Cholesterol 53 12/10/2019 08:47 AM    LDL, calculated 65 12/10/2019 08:47 AM    VLDL, calculated 19 12/10/2019 08:47 AM    Triglyceride 95 12/10/2019 08:47 AM    CHOL/HDL Ratio 3.1 04/23/2019 04:58 PM       Lab Results   Component Value Date/Time    Sodium 140 04/23/2019 04:14 AM    Potassium 3.9 04/23/2019 04:14 AM    Chloride 108 04/23/2019 04:14 AM    CO2 27 04/23/2019 04:14 AM    Anion gap 5 04/23/2019 04:14 AM    Glucose 98 04/23/2019 04:14 AM    BUN 13 04/23/2019 04:14 AM    Creatinine 0.91 04/23/2019 04:14 AM    BUN/Creatinine ratio 14 04/23/2019 04:14 AM    GFR est AA >60 04/23/2019 04:14 AM    GFR est non-AA >60 04/23/2019 04:14 AM    Calcium 8.8 04/23/2019 04:14 AM       Lab Results   Component Value Date/Time    ALT (SGPT) 36 09/04/2020 09:01 AM    Alk.  phosphatase 77 09/04/2020 09:01 AM    Bilirubin, direct 0.26 09/04/2020 09:01 AM    Bilirubin, total 1.0 09/04/2020 09:01 AM       Lab Results   Component Value Date/Time    WBC 8.2 04/24/2019 03:25 AM    HGB 13.2 04/24/2019 03:25 AM    HCT 40.4 04/24/2019 03:25 AM    PLATELET 460 34/73/7180 03:25 AM    MCV 94.2 04/24/2019 03:25 AM       No results found for: TSH, TSH2, TSH3, TSHP, TSHEXT      Lab Results   Component Value Date/Time    Cholesterol, total 137 12/10/2019 08:47 AM    Cholesterol, total 114 05/17/2019 09:14 AM    Cholesterol, total 169 04/23/2019 04:58 PM    Cholesterol, Total 131 09/04/2020 09:01 AM    HDL Cholesterol 53 12/10/2019 08:47 AM    HDL Cholesterol 44 05/17/2019 09:14 AM    HDL Cholesterol 54 04/23/2019 04:58 PM    LDL, calculated 65 12/10/2019 08:47 AM    LDL, calculated 56 05/17/2019 09:14 AM    LDL, calculated 101.4 (H) 04/23/2019 04:58 PM    Triglyceride 95 12/10/2019 08:47 AM    Triglyceride 69 05/17/2019 09:14 AM    Triglyceride 68 04/23/2019 04:58 PM    CHOL/HDL Ratio 3.1 04/23/2019 04:58 PM                Please note that this dictation was completed with [employer] the [employer] voice recognition software. Quite often unanticipated grammatical, syntax, homophones, and other interpretative errors are inadvertently transcribed by the computer software. Please disregard these errors. Please excuse any errors that have escaped final proofreading.       \

## 2021-09-10 RX ORDER — AMLODIPINE AND BENAZEPRIL HYDROCHLORIDE 5; 10 MG/1; MG/1
CAPSULE ORAL
Qty: 90 CAPSULE | Refills: 3 | Status: SHIPPED | OUTPATIENT
Start: 2021-09-10 | End: 2022-09-14

## 2021-09-10 RX ORDER — ATORVASTATIN CALCIUM 40 MG/1
40 TABLET, FILM COATED ORAL
Qty: 90 TABLET | Refills: 0 | Status: SHIPPED | OUTPATIENT
Start: 2021-09-10 | End: 2021-12-14

## 2021-09-10 NOTE — TELEPHONE ENCOUNTER
Requested Prescriptions     Signed Prescriptions Disp Refills    atorvastatin (LIPITOR) 40 mg tablet 90 Tablet 0     Sig: Take 1 Tablet by mouth nightly. PCP follows cholesterol. Send future refills to PCP. Authorizing Provider: Jennifer Sebastian     Ordering User: Rich Villela    amLODIPine-benazepril (LOTREL) 5-10 mg per capsule 90 Capsule 3     Sig: TAKE 1 CAPSULE BY MOUTH DAILY     Authorizing Provider: Jennifer Sebastian     Ordering User: Rich Tika     Verbal order per Dr. Jarvis Dunahm.     Future Appointments   Date Time Provider Marcy Mireles   6/1/2022  8:40 AM MD DORIS Marin AMB

## 2021-12-14 RX ORDER — ATORVASTATIN CALCIUM 40 MG/1
TABLET, FILM COATED ORAL
Qty: 90 TABLET | Refills: 1 | Status: SHIPPED | OUTPATIENT
Start: 2021-12-14 | End: 2022-06-08

## 2021-12-14 NOTE — TELEPHONE ENCOUNTER
Cardiologist: Dr. Addi Trammell    Last appt: 5/12/2020  Future Appointments   Date Time Provider Marcy Mireles   6/1/2022  8:40 AM MD DORIS Masterson BS AMB       Requested Prescriptions     Signed Prescriptions Disp Refills    atorvastatin (LIPITOR) 40 mg tablet 90 Tablet 1     Sig: TAKE 1 TABLET BY MOUTH NIGHTLY     Authorizing Provider: Terry Millan     Ordering User: KRISTEN POWERS         Refasia VO per Dr. Addi Trammell.

## 2022-03-19 PROBLEM — I25.10 CORONARY ARTERY DISEASE INVOLVING NATIVE CORONARY ARTERY: Status: ACTIVE | Noted: 2019-04-25

## 2022-03-19 PROBLEM — I21.4 NSTEMI (NON-ST ELEVATED MYOCARDIAL INFARCTION) (HCC): Status: ACTIVE | Noted: 2019-04-22

## 2022-06-08 RX ORDER — ATORVASTATIN CALCIUM 40 MG/1
40 TABLET, FILM COATED ORAL
Qty: 90 TABLET | Refills: 0 | Status: SHIPPED | OUTPATIENT
Start: 2022-06-08 | End: 2022-09-14

## 2022-06-08 NOTE — TELEPHONE ENCOUNTER
Cardiologist: Dr. Yo Vogel    Last appt: Visit date not found  No future appointments. Requested Prescriptions     Signed Prescriptions Disp Refills    atorvastatin (LIPITOR) 40 mg tablet 90 Tablet 0     Sig: Take 1 Tablet by mouth nightly. **FOLLOW UP WITH DR Sanju Meyer FOR FURTHER REFILLS**     Authorizing Provider: Moshe Perez     Ordering User: KRISTEN POWERS         Refills VO per Dr. Yo Vogel.

## 2022-09-14 RX ORDER — AMLODIPINE AND BENAZEPRIL HYDROCHLORIDE 5; 10 MG/1; MG/1
1 CAPSULE ORAL DAILY
Qty: 30 CAPSULE | Refills: 0 | Status: SHIPPED | OUTPATIENT
Start: 2022-09-14 | End: 2022-10-31 | Stop reason: SDUPTHER

## 2022-09-14 RX ORDER — ATORVASTATIN CALCIUM 40 MG/1
40 TABLET, FILM COATED ORAL DAILY
Qty: 30 TABLET | Refills: 0 | Status: SHIPPED | OUTPATIENT
Start: 2022-09-14 | End: 2022-10-31 | Stop reason: SDUPTHER

## 2022-09-14 NOTE — TELEPHONE ENCOUNTER
Cardiologist: Dr. Susan Fletcher    Last appt: Visit date not found  No future appointments. Requested Prescriptions     Signed Prescriptions Disp Refills    atorvastatin (LIPITOR) 40 mg tablet 30 Tablet 0     Sig: Take 1 Tablet by mouth daily. **FOLLOW UP WITH DR Marciano Cabezas FOR FURTHER REFILLS**     Authorizing Provider: Jess Vogel     Ordering User: KRISTEN POWERS    amLODIPine-benazepril (LOTREL) 5-10 mg per capsule 30 Capsule 0     Sig: Take 1 Capsule by mouth daily. **FOLLOW UP WITH DR Marciano Cabezas FOR FURTHER REFILLS**     Authorizing Provider: Jess Vogel     Ordering User: KRISTEN POWERS         Refills VO per Dr. Susan Fletcher.

## 2022-10-27 RX ORDER — ATORVASTATIN CALCIUM 40 MG/1
TABLET, FILM COATED ORAL
Qty: 90 TABLET | OUTPATIENT
Start: 2022-10-27

## 2022-10-27 RX ORDER — AMLODIPINE AND BENAZEPRIL HYDROCHLORIDE 5; 10 MG/1; MG/1
CAPSULE ORAL
Qty: 90 CAPSULE | OUTPATIENT
Start: 2022-10-27

## 2022-10-31 RX ORDER — AMLODIPINE AND BENAZEPRIL HYDROCHLORIDE 5; 10 MG/1; MG/1
1 CAPSULE ORAL DAILY
Qty: 14 CAPSULE | Refills: 0 | Status: SHIPPED | OUTPATIENT
Start: 2022-10-31 | End: 2022-11-14

## 2022-10-31 RX ORDER — ATORVASTATIN CALCIUM 40 MG/1
40 TABLET, FILM COATED ORAL DAILY
Qty: 14 TABLET | Refills: 0 | Status: SHIPPED | OUTPATIENT
Start: 2022-10-31 | End: 2022-11-14 | Stop reason: SDUPTHER

## 2022-10-31 NOTE — TELEPHONE ENCOUNTER
Patient is requesting a refill, states he is out of these meds      [facility] [phone]                [address]      Patient has an upcoming appointment on 11/14/2022

## 2022-10-31 NOTE — TELEPHONE ENCOUNTER
Refills per VO of Dr. Martir Maya. Requested Prescriptions     Pending Prescriptions Disp Refills    atorvastatin (LIPITOR) 40 mg tablet 14 Tablet 0     Sig: Take 1 Tablet by mouth daily. **Keep follow up with Aylin Lemon NP for further refills. **    amLODIPine-benazepril (LOTREL) 5-10 mg per capsule 14 Capsule 0     Sig: Take 1 Capsule by mouth daily.  **Keep follow up with Aylin Lemon NP for further refills**     Future Appointments   Date Time Provider hospitals   11/14/2022  1:20 PM Kristi Valente NP [facility]

## 2022-11-11 NOTE — PROGRESS NOTES
[address] [facility]               OFFICE NOTE         [facility]   1975  [id number]    Date/Time:  11/14/2022  Cardiologist:  Sruthi Higgins M.D.,LUANNA.C.C.    SUBJECTIVE:    Today he presents for follow-up and medication refill. He denies chest pain, shortness of breath, palpitations. He continues to be active with running many times per week. Today his BP is elevated. ECG today SB 53, no ischemic changes    Assessment/Plan:    1. CAD: Status post NSTEMI in April 2019 related to stenosis and thrombus of the RCA. At that time status post successful PCI of the RCA with a drug-eluting stent. He does have residual obtuse marginal stenosis as per interventionist obtuse marginal 2 will be difficult to treat with PCI due to ostial stenosis with immediate bifurcation involving both branches of the obtuse marginal.    This is being treated medically thus far. Despite a known obtuse marginal branch stenosis he had a stress echocardiogram in November 2020 which failed to reveal any gross ischemia. Continue aspirin and Lipitor. Will check CBC today     Now more than 2 years after his NSTEMI. Toprol was stopped previously. He will follow up with Dr. Mirian Mansfield again in 1 year     2. Hypertension: Today BP is elevated. Increased dose of amlodipine-benazepril to 5/20mg  Will check BP at home 3 days per week and send readings to me in [place] message in 3 weeks   Will check CMP today     3. Hyperlipidemia: On Lipitor, previously followed by his primary care physician. Will check fasting labs today    4. Vitamin D deficiency: will check vitamin D level now     Encouraged him to stay active. HPI   40 y.o. male. with PMH of HTN, presented 4/22/19 with chief c/o chest pain. Noted to have NSTEMI with troponin peak of 1.65. Underwent success PCI/GALINA of proximal RCA.       Left heart catheterization with PCI on 4/23/19  Findings:   L Main: large, no significant disease  LAD: large caliber, mild disease  LCx: large, supplies L-PL branches, OM 2 with ostial disease of 70% with immediate bifurcation into two branches, superior branch with 70% disease and smaller inferior 80% diseae  RCA: large caliber vessel, proximal  with hazy filling defect, distal vessel fills via collaterals   LVEDP:  10-12 mmhg   PCI:   Two vessel CAD with disease involving branches of OM and sub-acute occlusion of proximal RCA  2. Normal LVEDP  3. Successful PCI of proximal RCA    CARDIAC STUDIES    11/20/20    ECHO STRESS 11/20/2020 11/23/2020    Interpretation Summary  · Baseline ECG: Normal sinus rhythm. · Low risk Duke treadmill score. · Negative stress test.  · Normal stress echocardiogram. Low risk study. Signed by: Finn Henderson MD on 11/20/2020 10:12 AM    05/23/19    NUCLEAR CARDIAC STRESS TEST 05/24/2019 5/24/2019    Interpretation Summary  · Baseline ECG: Normal sinus rhythm, non-specific ST-T wave abnormalities. · Low risk Duke treadmill score. · Gated SPECT: Left ventricular function post-stress was normal. Calculated ejection fraction is 59%. · Myocardial perfusion imaging defect 1: There is a defect that is small to moderate in size with a mild reduction in uptake present in the mid to basal inferolateral location(s) that is reversible. The defect appears to probably be ischemia. · Myocardial perfusion imaging defect 2: There is a defect that is small to moderate in size with a moderate reduction in uptake affecting the apical anterior and inferior location(s) that is non-reversible. The defect appears to probably be artifact.   · Left ventricular perfusion is probably abnormal.    Mild ischemia basal to mid infero lateral wall  Apical artifact  EF 59%    Signed by: Finn Henderson MD on 5/24/2019  7:52 AM    04/22/19    CARDIAC PROCEDURE 04/25/2019 4/25/2019    Conclusion  Findings:  L Main: large, no significant disease  LAD: large caliber, mild disease  LCx: large, supplies L-PL branches, OM 2 with ostial disease of 70% with immediate bifurcation into two branches, superior branch with 70% disease and smaller inferior 80% diseae  RCA: large caliber vessel, proximal  with hazy filling defect, distal vessel fills via collaterals    LVEDP:  10-12 mmhg    PCI:  AL 0.75 guide  Heparin for AC  runthrough wire passed easily into distal vessel    2.5x12 compliant balloon  Post-inflation evidence of thrombus in distal aspect of RV marginal    3.5x28mm Xience Yuridia GALINA, deployed at [facility]. Distally post-dilated with 3.5NC balloon to high pressure  Mid and proximal with 4. 0NC balloon at high pressure  Very proximal aspect of stent 4.5NC balloon at high pressure    IVUS guided    Findings:  1. 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 Normal LVEDP  3. Successful PCI of proximal RCA    Signed by: Roberta Turk DO on 4/25/2019 10:06 AM          EKG Results       Procedure 720 Value Units Date/Time    AMB POC EKG ROUTINE W/ 12 LEADS, INTER & REP [[id number]] Resulted: 11/14/22 1450    Order Status: Completed Updated: 11/14/22 1454            IMAGING      MRI Results (most recent):  No results found for this or any previous visit. CT Results (most recent):  No results found for this or any previous visit. XR Results (most recent):  Results from Hospital Encounter encounter on 04/22/19    XR CHEST PA LAT    Narrative  EXAM:  XR CHEST PA LAT    INDICATION: Exertional chest discomfort    COMPARISON: None    TECHNIQUE: PA and lateral chest views    FINDINGS: The cardiomediastinal and hilar contours are within normal limits. The  pulmonary vasculature is within normal limits. The lungs and pleural spaces are clear. There is no pneumothorax. The visualized  bones and upper abdomen are age-appropriate. Impression  IMPRESSION:    No acute process.           Past Medical History:   Diagnosis Date    CAD (coronary artery disease)     HTN (hypertension)      Past Surgical History:   Procedure Laterality Date    HX HEENT      adnoids removed as a child    OK CARDIAC SURG PROCEDURE UNLIST  04/23/2019    1 stent     Social History     Tobacco Use    Smoking status: Never    Smokeless tobacco: Never   Substance Use Topics    Alcohol use: Yes     Alcohol/week: 14.0 standard drinks     Types: 7 Cans of beer, 7 Glasses of wine per week    Drug use: Not Currently     Family History   Problem Relation Age of Onset    Heart Disease Father         heart attack    Hypertension Father     Elevated Lipids Father     Elevated Lipids Brother      No Known Allergies      Visit Vitals  BP (!) 158/96   Pulse 68   Resp 18   Ht 6' (1.829 m)   Wt 200 lb 9.6 oz (91 kg)   SpO2 99%   BMI 27.21 kg/m²         Last 3 Recorded Weights in this Encounter    11/14/22 1444   Weight: 200 lb 9.6 oz (91 kg)            Review of Systems:   Pertinent items are noted in the History of Present Illness. Visit Vitals  BP (!) 158/96   Pulse 68   Resp 18   Ht 6' (1.829 m)   Wt 200 lb 9.6 oz (91 kg)   SpO2 99%   BMI 27.21 kg/m²     General Appearance:  Well developed, well nourished,alert and oriented x 3, and individual in no acute distress. Ears/Nose/Mouth/Throat:   Hearing grossly normal.         Neck: Supple. Chest:   Lungs clear to auscultation bilaterally. Cardiovascular:  Regular rate and rhythm, S1, S2 normal, no murmur. Abdomen:   Soft, non-tender, bowel sounds are active. Extremities: No edema bilaterally. Skin: Warm and dry. Current Outpatient Medications on File Prior to Visit   Medication Sig Dispense Refill    aspirin 81 mg chewable tablet Take 1 Tab by mouth daily. 90 Tab 3    B.animalis,bifid,infantis,long 10-15 mg TbEC Take 1 Tab by mouth daily. cholecalciferol (VITAMIN D3) (1000 Units /25 mcg) tablet Take 1,000 Units by mouth daily.       [DISCONTINUED] atorvastatin (LIPITOR) 40 mg tablet Take 1 Tablet by mouth daily. **Keep follow up with Siddharth Velazquez NP for further refills. ** 14 Tablet 0    [DISCONTINUED] amLODIPine-benazepril (LOTREL) 5-10 mg per capsule Take 1 Capsule by mouth daily. **Keep follow up with Siddharth Velazquez NP for further refills** 14 Capsule 0     No current facility-administered medications on file prior to visit. Luna Foy had no medications administered during this visit. Current Outpatient Medications   Medication Sig    amLODIPine-benazepril (LOTREL) 5-20 mg per capsule Take 1 Capsule by mouth daily. atorvastatin (LIPITOR) 40 mg tablet Take 1 Tablet by mouth daily. **Keep follow up with Siddharth Velazquez NP for further refills. **    aspirin 81 mg chewable tablet Take 1 Tab by mouth daily. B.animalis,bifid,infantis,long 10-15 mg TbEC Take 1 Tab by mouth daily. cholecalciferol (VITAMIN D3) (1000 Units /25 mcg) tablet Take 1,000 Units by mouth daily. No current facility-administered medications for this visit. Lab Results   Component Value Date/Time    Cholesterol, total 137 12/10/2019 08:47 AM    Cholesterol, Total 131 09/04/2020 09:01 AM    HDL Cholesterol 53 12/10/2019 08:47 AM    LDL, calculated 65 12/10/2019 08:47 AM    VLDL, calculated 19 12/10/2019 08:47 AM    Triglyceride 95 12/10/2019 08:47 AM    CHOL/HDL Ratio 3.1 04/23/2019 04:58 PM       Lab Results   Component Value Date/Time    Sodium 140 04/23/2019 04:14 AM    Potassium 3.9 04/23/2019 04:14 AM    Chloride 108 04/23/2019 04:14 AM    CO2 27 04/23/2019 04:14 AM    Anion gap 5 04/23/2019 04:14 AM    Glucose 98 04/23/2019 04:14 AM    BUN 13 04/23/2019 04:14 AM    Creatinine 0.91 04/23/2019 04:14 AM    BUN/Creatinine ratio 14 04/23/2019 04:14 AM    GFR est AA >60 04/23/2019 04:14 AM    GFR est non-AA >60 04/23/2019 04:14 AM    Calcium 8.8 04/23/2019 04:14 AM       Lab Results   Component Value Date/Time    ALT (SGPT) 36 09/04/2020 09:01 AM    Alk.  phosphatase 77 09/04/2020 09:01 AM    Bilirubin, direct 0.26 09/04/2020 09:01 AM    Bilirubin, total 1.0 09/04/2020 09:01 AM       Lab Results   Component Value Date/Time    WBC 8.2 04/24/2019 03:25 AM    HGB 13.2 04/24/2019 03:25 AM    HCT 40.4 04/24/2019 03:25 AM    PLATELET 640 89/26/9201 03:25 AM    MCV 94.2 04/24/2019 03:25 AM       No results found for: TSH, TSH2, TSH3, TSHP, TSHEXT, TSHEXT      Lab Results   Component Value Date/Time    Cholesterol, total 137 12/10/2019 08:47 AM    Cholesterol, total 114 05/17/2019 09:14 AM    Cholesterol, total 169 04/23/2019 04:58 PM    Cholesterol, Total 131 09/04/2020 09:01 AM    HDL Cholesterol 53 12/10/2019 08:47 AM    HDL Cholesterol 44 05/17/2019 09:14 AM    HDL Cholesterol 54 04/23/2019 04:58 PM    LDL, calculated 65 12/10/2019 08:47 AM    LDL, calculated 56 05/17/2019 09:14 AM    LDL, calculated 101.4 (H) 04/23/2019 04:58 PM    Triglyceride 95 12/10/2019 08:47 AM    Triglyceride 69 05/17/2019 09:14 AM    Triglyceride 68 04/23/2019 04:58 PM    CHOL/HDL Ratio 3.1 04/23/2019 04:58 PM        MOMO Goff, [facility]   [address] [place] [address], [address] [place] [address] [address]  [address]  () [phone] (AEP) [phone]

## 2022-11-14 ENCOUNTER — TELEPHONE (OUTPATIENT)
Dept: CARDIOLOGY CLINIC | Age: 47
End: 2022-11-14

## 2022-11-14 ENCOUNTER — OFFICE VISIT (OUTPATIENT)
Dept: CARDIOLOGY CLINIC | Age: 47
End: 2022-11-14
Payer: COMMERCIAL

## 2022-11-14 VITALS
HEIGHT: 72 IN | HEART RATE: 68 BPM | BODY MASS INDEX: 27.17 KG/M2 | OXYGEN SATURATION: 99 % | WEIGHT: 200.6 LBS | SYSTOLIC BLOOD PRESSURE: 158 MMHG | DIASTOLIC BLOOD PRESSURE: 96 MMHG | RESPIRATION RATE: 18 BRPM

## 2022-11-14 DIAGNOSIS — I25.10 CORONARY ARTERY DISEASE INVOLVING NATIVE CORONARY ARTERY OF NATIVE HEART WITHOUT ANGINA PECTORIS: Primary | ICD-10-CM

## 2022-11-14 DIAGNOSIS — I10 ESSENTIAL HYPERTENSION: ICD-10-CM

## 2022-11-14 DIAGNOSIS — E78.49 OTHER HYPERLIPIDEMIA: ICD-10-CM

## 2022-11-14 DIAGNOSIS — R00.1 BRADYCARDIA, SINUS: ICD-10-CM

## 2022-11-14 PROCEDURE — 3074F SYST BP LT 130 MM HG: CPT | Performed by: NURSE PRACTITIONER

## 2022-11-14 PROCEDURE — 99214 OFFICE O/P EST MOD 30 MIN: CPT | Performed by: NURSE PRACTITIONER

## 2022-11-14 PROCEDURE — 93000 ELECTROCARDIOGRAM COMPLETE: CPT | Performed by: NURSE PRACTITIONER

## 2022-11-14 PROCEDURE — 3078F DIAST BP <80 MM HG: CPT | Performed by: NURSE PRACTITIONER

## 2022-11-14 RX ORDER — ATORVASTATIN CALCIUM 40 MG/1
40 TABLET, FILM COATED ORAL DAILY
Qty: 90 TABLET | Refills: 3 | Status: SHIPPED | OUTPATIENT
Start: 2022-11-14

## 2022-11-14 RX ORDER — AMLODIPINE AND BENAZEPRIL HYDROCHLORIDE 5; 20 MG/1; MG/1
1 CAPSULE ORAL DAILY
Qty: 90 CAPSULE | Refills: 3 | Status: SHIPPED | OUTPATIENT
Start: 2022-11-14

## 2022-11-14 NOTE — PATIENT INSTRUCTIONS
I have increased your dose of amlodipine-benazepril to 5/20 one tablet by mouth daily. Please check your blood pressure three days/week (at least one hour after your morning blood pressure medications.)  Keep a written record of your blood pressure readings and send me a [email or web address] message in 3 weeks with your readings. If your systolic blood pressure is consistently greater than 150mmHg or less than 100mmHg then please call the office.     Please have fasting labs drawn in [address] - bring your printout with you    Remind us to send your results to your PCP

## 2022-11-14 NOTE — TELEPHONE ENCOUNTER
Patient did not show for appointment today with me    Please call to see if he would like to reschedule with me or Dr. Albert Danielle, last OV 9/21

## 2022-11-15 ENCOUNTER — TELEPHONE (OUTPATIENT)
Dept: CARDIOLOGY CLINIC | Age: 47
End: 2022-11-15

## 2022-11-15 NOTE — TELEPHONE ENCOUNTER
Patient arrived late for his appointment with Bridget Del Valle NP.   Follow up appointment is as scheduled below:    Future Appointments   Date Time Provider Marcy Mireles   11/14/2023  1:00 PM MD DORIS [facility]

## 2022-11-15 NOTE — TELEPHONE ENCOUNTER
Ara Quintero, NP routed conversation to You 19 hours ago (1:52 PM)     Ara Quintero, NP 19 hours ago (1:52 PM)     KD  Patient did not show for appointment today with me     Please call to see if he would like to reschedule with me or Dr. Nishant Cordero, last OV 9/21         Note

## 2022-11-16 DIAGNOSIS — E78.49 OTHER HYPERLIPIDEMIA: ICD-10-CM

## 2022-11-16 DIAGNOSIS — I10 ESSENTIAL HYPERTENSION: ICD-10-CM

## 2022-11-16 DIAGNOSIS — I25.10 CORONARY ARTERY DISEASE INVOLVING NATIVE CORONARY ARTERY OF NATIVE HEART WITHOUT ANGINA PECTORIS: ICD-10-CM

## 2022-11-16 LAB
25(OH)D3 SERPL-MCNC: 46 NG/ML (ref 30–100)
ALBUMIN SERPL-MCNC: 3.9 G/DL (ref 3.5–5)
ALBUMIN/GLOB SERPL: 1.3 {RATIO} (ref 1.1–2.2)
ALP SERPL-CCNC: 81 U/L (ref 45–117)
ALT SERPL-CCNC: 35 U/L (ref 12–78)
ANION GAP SERPL CALC-SCNC: 5 MMOL/L (ref 5–15)
AST SERPL-CCNC: 16 U/L (ref 15–37)
BASOPHILS # BLD: 0 K/UL (ref 0–0.1)
BASOPHILS NFR BLD: 1 % (ref 0–1)
BILIRUB SERPL-MCNC: 0.8 MG/DL (ref 0.2–1)
BUN SERPL-MCNC: 13 MG/DL (ref 6–20)
BUN/CREAT SERPL: 13 (ref 12–20)
CALCIUM SERPL-MCNC: 9.1 MG/DL (ref 8.5–10.1)
CHLORIDE SERPL-SCNC: 104 MMOL/L (ref 97–108)
CHOLEST SERPL-MCNC: 130 MG/DL
CO2 SERPL-SCNC: 30 MMOL/L (ref 21–32)
CREAT SERPL-MCNC: 0.98 MG/DL (ref 0.7–1.3)
DIFFERENTIAL METHOD BLD: NORMAL
EOSINOPHIL # BLD: 0.1 K/UL (ref 0–0.4)
EOSINOPHIL NFR BLD: 3 % (ref 0–7)
ERYTHROCYTE [DISTWIDTH] IN BLOOD BY AUTOMATED COUNT: 11.6 % (ref 11.5–14.5)
GLOBULIN SER CALC-MCNC: 3.1 G/DL (ref 2–4)
GLUCOSE SERPL-MCNC: 96 MG/DL (ref 65–100)
HCT VFR BLD AUTO: 44.5 % (ref 36.6–50.3)
HDLC SERPL-MCNC: 47 MG/DL
HDLC SERPL: 2.8 {RATIO} (ref 0–5)
HGB BLD-MCNC: 15.1 G/DL (ref 12.1–17)
IMM GRANULOCYTES # BLD AUTO: 0 K/UL (ref 0–0.04)
IMM GRANULOCYTES NFR BLD AUTO: 0 % (ref 0–0.5)
LDLC SERPL CALC-MCNC: 66.2 MG/DL (ref 0–100)
LYMPHOCYTES # BLD: 1.7 K/UL (ref 0.8–3.5)
LYMPHOCYTES NFR BLD: 32 % (ref 12–49)
MCH RBC QN AUTO: 30.9 PG (ref 26–34)
MCHC RBC AUTO-ENTMCNC: 33.9 G/DL (ref 30–36.5)
MCV RBC AUTO: 91 FL (ref 80–99)
MONOCYTES # BLD: 0.4 K/UL (ref 0–1)
MONOCYTES NFR BLD: 8 % (ref 5–13)
NEUTS SEG # BLD: 3.1 K/UL (ref 1.8–8)
NEUTS SEG NFR BLD: 56 % (ref 32–75)
NRBC # BLD: 0 K/UL (ref 0–0.01)
NRBC BLD-RTO: 0 PER 100 WBC
PLATELET # BLD AUTO: 206 K/UL (ref 150–400)
PMV BLD AUTO: 10.3 FL (ref 8.9–12.9)
POTASSIUM SERPL-SCNC: 4.6 MMOL/L (ref 3.5–5.1)
PROT SERPL-MCNC: 7 G/DL (ref 6.4–8.2)
RBC # BLD AUTO: 4.89 M/UL (ref 4.1–5.7)
SODIUM SERPL-SCNC: 139 MMOL/L (ref 136–145)
TRIGL SERPL-MCNC: 84 MG/DL (ref ?–150)
VLDLC SERPL CALC-MCNC: 16.8 MG/DL
WBC # BLD AUTO: 5.5 K/UL (ref 4.1–11.1)

## 2023-05-21 RX ORDER — ASPIRIN 81 MG/1
81 TABLET, CHEWABLE ORAL DAILY
COMMUNITY
Start: 2019-04-26

## 2023-05-21 RX ORDER — AMLODIPINE BESYLATE AND BENAZEPRIL HYDROCHLORIDE 5; 20 MG/1; MG/1
1 CAPSULE ORAL DAILY
COMMUNITY
Start: 2022-11-14

## 2023-05-21 RX ORDER — ATORVASTATIN CALCIUM 40 MG/1
40 TABLET, FILM COATED ORAL DAILY
COMMUNITY
Start: 2022-11-14

## 2024-03-13 ENCOUNTER — TELEPHONE (OUTPATIENT)
Age: 49
End: 2024-03-13

## 2024-03-13 NOTE — TELEPHONE ENCOUNTER
Patient is calling because he switched jobs and no longer sees the provider that prescribed the medications Atorvastatin and Amlodipine. Patient would like to know if Dr. Calero can refill those medications and send refill to [facility]. Patient would also like a call back from nurse.     Patient phone number - [phone]

## 2024-03-14 RX ORDER — AMLODIPINE BESYLATE AND BENAZEPRIL HYDROCHLORIDE 5; 20 MG/1; MG/1
1 CAPSULE ORAL DAILY
Qty: 90 CAPSULE | Refills: 1 | Status: SHIPPED | OUTPATIENT
Start: 2024-03-14

## 2024-03-14 RX ORDER — ATORVASTATIN CALCIUM 40 MG/1
40 TABLET, FILM COATED ORAL DAILY
Qty: 90 TABLET | Refills: 1 | Status: SHIPPED | OUTPATIENT
Start: 2024-03-14

## 2024-08-20 RX ORDER — AMLODIPINE BESYLATE AND BENAZEPRIL HYDROCHLORIDE 5; 20 MG/1; MG/1
1 CAPSULE ORAL DAILY
Qty: 90 CAPSULE | Refills: 3 | Status: SHIPPED | OUTPATIENT
Start: 2024-08-20

## 2024-08-20 RX ORDER — ATORVASTATIN CALCIUM 40 MG/1
40 TABLET, FILM COATED ORAL DAILY
Qty: 90 TABLET | Refills: 3 | Status: SHIPPED | OUTPATIENT
Start: 2024-08-20

## 2024-08-20 NOTE — TELEPHONE ENCOUNTER
Cardiologist: Dr. Calero    Future Appointments   Date Time Provider Department Center   8/30/2024  9:00 AM ITZEL QUIÑONES [place] [address] [address]   9/9/2024  9:00 AM Mo Calero MD [facility]       Requested Prescriptions     Signed Prescriptions Disp Refills    atorvastatin (LIPITOR) 40 MG tablet 90 tablet 3     Sig: TAKE 1 TABLET DAILY     Authorizing Provider: MO CALERO     Ordering User: ANGELITA NATION    amLODIPine-benazepril (LOTREL) 5-20 MG per capsule 90 capsule 3     Sig: TAKE 1 CAPSULE DAILY     Authorizing Provider: MO CALERO     Ordering User: ANGELITA NATION         Refills VO per Dr. Calero.

## 2024-08-30 ENCOUNTER — TELEPHONE (OUTPATIENT)
Age: 49
End: 2024-08-30

## 2024-09-05 DIAGNOSIS — R00.1 BRADYCARDIA, SINUS: ICD-10-CM

## 2024-09-05 DIAGNOSIS — I21.4 NSTEMI (NON-ST ELEVATED MYOCARDIAL INFARCTION) (HCC): ICD-10-CM

## 2024-09-06 LAB
CHOLEST SERPL-MCNC: 135 MG/DL (ref 100–199)
HDL SERPL-SCNC: 32.9 UMOL/L
HDLC SERPL-MCNC: 47 MG/DL
LDL SERPL QN: 20.6 NM
LDL SERPL-SCNC: 807 NMOL/L
LDL SMALL SERPL-SCNC: 377 NMOL/L
LDLC SERPL CALC-MCNC: 73 MG/DL (ref 0–99)
LP-IR SCORE SERPL: 56
TRIGL SERPL-MCNC: 75 MG/DL (ref 0–149)

## 2024-09-09 ENCOUNTER — OFFICE VISIT (OUTPATIENT)
Age: 49
End: 2024-09-09
Payer: COMMERCIAL

## 2024-09-09 VITALS
SYSTOLIC BLOOD PRESSURE: 120 MMHG | HEART RATE: 68 BPM | HEIGHT: 72 IN | BODY MASS INDEX: 27.9 KG/M2 | RESPIRATION RATE: 16 BRPM | WEIGHT: 206 LBS | OXYGEN SATURATION: 98 % | DIASTOLIC BLOOD PRESSURE: 78 MMHG

## 2024-09-09 DIAGNOSIS — R00.1 BRADYCARDIA, SINUS: Primary | ICD-10-CM

## 2024-09-09 DIAGNOSIS — I25.10 CORONARY ARTERY DISEASE INVOLVING NATIVE CORONARY ARTERY OF NATIVE HEART WITHOUT ANGINA PECTORIS: ICD-10-CM

## 2024-09-09 PROCEDURE — 93000 ELECTROCARDIOGRAM COMPLETE: CPT | Performed by: SPECIALIST

## 2024-09-09 PROCEDURE — 99214 OFFICE O/P EST MOD 30 MIN: CPT | Performed by: SPECIALIST

## 2024-09-09 ASSESSMENT — PATIENT HEALTH QUESTIONNAIRE - PHQ9
SUM OF ALL RESPONSES TO PHQ9 QUESTIONS 1 & 2: 0
1. LITTLE INTEREST OR PLEASURE IN DOING THINGS: NOT AT ALL
SUM OF ALL RESPONSES TO PHQ QUESTIONS 1-9: 0
2. FEELING DOWN, DEPRESSED OR HOPELESS: NOT AT ALL
SUM OF ALL RESPONSES TO PHQ QUESTIONS 1-9: 0

## 2025-07-03 ENCOUNTER — TELEPHONE (OUTPATIENT)
Age: 50
End: 2025-07-03

## 2025-07-03 DIAGNOSIS — I25.10 CORONARY ARTERY DISEASE INVOLVING NATIVE CORONARY ARTERY OF NATIVE HEART WITHOUT ANGINA PECTORIS: Primary | ICD-10-CM

## 2025-07-03 DIAGNOSIS — E78.5 HYPERLIPIDEMIA: ICD-10-CM

## 2025-07-03 RX ORDER — ATORVASTATIN CALCIUM 40 MG/1
40 TABLET, FILM COATED ORAL DAILY
Qty: 90 TABLET | Refills: 1 | Status: SHIPPED | OUTPATIENT
Start: 2025-07-03

## 2025-07-03 RX ORDER — AMLODIPINE AND BENAZEPRIL HYDROCHLORIDE 5; 20 MG/1; MG/1
1 CAPSULE ORAL DAILY
Qty: 90 CAPSULE | Refills: 1 | Status: SHIPPED | OUTPATIENT
Start: 2025-07-03

## 2025-07-03 NOTE — TELEPHONE ENCOUNTER
Pt is calling wanting refills for atorvastatin (LIPITOR) 40 MG tablet and amLODIPine-benazepril (LOTREL) 5-20 MG .  He would also like to have an order for any labs that need to completed prior to appt. He would like a call back if labs need to be done    Pt # [phone]    [facility] - [facility], [address] - P [phone] -  [phone]

## 2025-07-03 NOTE — TELEPHONE ENCOUNTER
Identifiers x 2. Refills sent.  Fasting labs overdue.  Will place new order for labs to be obtained prior to follow up in September/2025.     Requested Prescriptions     Signed Prescriptions Disp Refills    amLODIPine-benazepril (LOTREL) 5-20 MG per capsule 90 capsule 1     Sig: Take 1 capsule by mouth daily     Authorizing Provider: JERAD MCLEOD     Ordering User: DOMI STEVENS    atorvastatin (LIPITOR) 40 MG tablet 90 tablet 1     Sig: Take 1 tablet by mouth daily     Authorizing Provider: JERAD MCLEOD     Ordering User: DOMI STEVENS     Verbal order per Dr. Mcleod.     Future Appointments   Date Time Provider Department Center   9/30/2025  2:00 PM Mo Calero MD [facility]

## 2025-07-09 ENCOUNTER — TELEPHONE (OUTPATIENT)
Age: 50
End: 2025-07-09

## 2025-08-05 DIAGNOSIS — I25.10 CORONARY ARTERY DISEASE INVOLVING NATIVE CORONARY ARTERY OF NATIVE HEART WITHOUT ANGINA PECTORIS: ICD-10-CM

## 2025-08-05 DIAGNOSIS — E78.5 HYPERLIPIDEMIA: ICD-10-CM

## 2025-08-05 LAB
CHOLEST SERPL-MCNC: 133 MG/DL (ref 0–200)
HDLC SERPL-MCNC: 50 MG/DL (ref 40–60)
HDLC SERPL: 2.7
LDLC SERPL CALC-MCNC: 60 MG/DL
TRIGL SERPL-MCNC: 117 MG/DL (ref 0–150)
VLDLC SERPL CALC-MCNC: 23 MG/DL

## 2025-08-06 LAB
ALBUMIN SERPL-MCNC: 4.1 G/DL (ref 3.5–5.2)
ALBUMIN/GLOB SERPL: 1.4 (ref 1.1–2.2)
ALP SERPL-CCNC: 82 U/L (ref 40–129)
ALT SERPL-CCNC: 42 U/L (ref 10–50)
AST SERPL-CCNC: 28 U/L (ref 10–50)
BILIRUB DIRECT SERPL-MCNC: 0.4 MG/DL (ref 0.1–0.3)
BILIRUB SERPL-MCNC: 1 MG/DL (ref 0–1.2)
GLOBULIN SER CALC-MCNC: 2.9 G/DL (ref 2–4)
PROT SERPL-MCNC: 7 G/DL (ref 6.4–8.3)

## 2025-08-13 ENCOUNTER — TELEPHONE (OUTPATIENT)
Age: 50
End: 2025-08-13

## 2025-08-15 ENCOUNTER — TELEPHONE (OUTPATIENT)
Age: 50
End: 2025-08-15

## 2025-08-15 ENCOUNTER — OFFICE VISIT (OUTPATIENT)
Age: 50
End: 2025-08-15
Payer: COMMERCIAL

## 2025-08-15 VITALS
SYSTOLIC BLOOD PRESSURE: 130 MMHG | BODY MASS INDEX: 28.99 KG/M2 | DIASTOLIC BLOOD PRESSURE: 92 MMHG | WEIGHT: 214 LBS | HEART RATE: 94 BPM | OXYGEN SATURATION: 95 % | HEIGHT: 72 IN

## 2025-08-15 DIAGNOSIS — I25.10 CORONARY ARTERY DISEASE INVOLVING NATIVE CORONARY ARTERY OF NATIVE HEART WITHOUT ANGINA PECTORIS: Primary | ICD-10-CM

## 2025-08-15 DIAGNOSIS — E78.00 PURE HYPERCHOLESTEROLEMIA: ICD-10-CM

## 2025-08-15 DIAGNOSIS — I21.4 NSTEMI (NON-ST ELEVATED MYOCARDIAL INFARCTION) (HCC): ICD-10-CM

## 2025-08-15 PROCEDURE — 1036F TOBACCO NON-USER: CPT | Performed by: NURSE PRACTITIONER

## 2025-08-15 PROCEDURE — G8427 DOCREV CUR MEDS BY ELIG CLIN: HCPCS | Performed by: NURSE PRACTITIONER

## 2025-08-15 PROCEDURE — 99214 OFFICE O/P EST MOD 30 MIN: CPT | Performed by: NURSE PRACTITIONER

## 2025-08-15 PROCEDURE — G8419 CALC BMI OUT NRM PARAM NOF/U: HCPCS | Performed by: NURSE PRACTITIONER

## 2025-08-15 PROCEDURE — 93000 ELECTROCARDIOGRAM COMPLETE: CPT | Performed by: NURSE PRACTITIONER

## 2025-08-15 ASSESSMENT — LIFESTYLE VARIABLES
HOW MANY STANDARD DRINKS CONTAINING ALCOHOL DO YOU HAVE ON A TYPICAL DAY: 1 OR 2
HOW OFTEN DO YOU HAVE A DRINK CONTAINING ALCOHOL: 4 OR MORE TIMES A WEEK

## 2025-08-15 ASSESSMENT — PATIENT HEALTH QUESTIONNAIRE - PHQ9
1. LITTLE INTEREST OR PLEASURE IN DOING THINGS: NOT AT ALL
SUM OF ALL RESPONSES TO PHQ QUESTIONS 1-9: 0
2. FEELING DOWN, DEPRESSED OR HOPELESS: NOT AT ALL
SUM OF ALL RESPONSES TO PHQ QUESTIONS 1-9: 0

## (undated) DEVICE — TR BAND RADIAL ARTERY COMPRESSION DEVICE: Brand: TR BAND

## (undated) DEVICE — NEU- GOOD SAM HOSPITAL: Brand: MEDLINE INDUSTRIES, INC.

## (undated) DEVICE — CATH BLLN ANGIO 4.50X8MM NC EUPHORIA RX

## (undated) DEVICE — ANGIOGRAPHIC CATHETER: Brand: IMPULSE™

## (undated) DEVICE — GLIDESHEATH SLENDER ACCESS KIT: Brand: GLIDESHEATH SLENDER

## (undated) DEVICE — COPILOT BLEEDBACK CONTROL VALVE: Brand: COPILOT

## (undated) DEVICE — KIT HND CTRL 3 W STPCOCK ROT END 54IN PREM HI PRSS TBNG AT

## (undated) DEVICE — CATH GUID COR AL75 6FR 100CM -- LAUNCHER

## (undated) DEVICE — CATH ANGI BLLN DIL 4.0X12MM -- NC EUPHORA

## (undated) DEVICE — KIT MED IMAG CNTRST AGNT W/ IOPAMIDOL REUSE

## (undated) DEVICE — CATH BLLN DIL 2.5 X12MM RX -- EUPHORA

## (undated) DEVICE — PACK PROCEDURE SURG HRT CATH

## (undated) DEVICE — 3M™ TEGADERM™ TRANSPARENT FILM DRESSING FRAME STYLE, 1626W, 4 IN X 4-3/4 IN (10 CM X 12 CM), 50/CT 4CT/CASE: Brand: 3M™ TEGADERM™

## (undated) DEVICE — RUNTHROUGH NS EXTRA FLOPPY PTCA GUIDEWIRE: Brand: RUNTHROUGH

## (undated) DEVICE — NC TREK CORONARY DILATATION CATHETER 3.5 MM X 15 MM / RAPID-EXCHANGE: Brand: NC TREK

## (undated) DEVICE — SPECIAL PROCEDURE DRAPE 32" X 34": Brand: SPECIAL PROCEDURE DRAPE

## (undated) DEVICE — ANGIOGRAPHY KIT

## (undated) DEVICE — SPLINT WR POS F/ARTERIAL ACC -- BX/10

## (undated) DEVICE — Device: Brand: EAGLE EYE PLATINUM RX DIGITAL IVUS CATHETER